# Patient Record
Sex: MALE | Race: BLACK OR AFRICAN AMERICAN | Employment: UNEMPLOYED | URBAN - NONMETROPOLITAN AREA
[De-identification: names, ages, dates, MRNs, and addresses within clinical notes are randomized per-mention and may not be internally consistent; named-entity substitution may affect disease eponyms.]

---

## 2024-10-07 ENCOUNTER — HOSPITAL ENCOUNTER (INPATIENT)
Age: 39
LOS: 5 days | Discharge: PSYCHIATRIC OR SUBSTANCE USE FACILITY | End: 2024-10-12
Attending: EMERGENCY MEDICINE | Admitting: INTERNAL MEDICINE

## 2024-10-07 ENCOUNTER — APPOINTMENT (OUTPATIENT)
Dept: GENERAL RADIOLOGY | Age: 39
End: 2024-10-07

## 2024-10-07 DIAGNOSIS — T14.91XA SUICIDE ATTEMPT (HCC): ICD-10-CM

## 2024-10-07 DIAGNOSIS — K92.2 UPPER GI BLEEDING: ICD-10-CM

## 2024-10-07 DIAGNOSIS — T54.92XA INGESTION OF BLEACH, INTENTIONAL SELF-HARM, INITIAL ENCOUNTER (HCC): Primary | ICD-10-CM

## 2024-10-07 PROBLEM — T54.94XA: Status: ACTIVE | Noted: 2024-10-07

## 2024-10-07 LAB
ALBUMIN SERPL BCG-MCNC: 4 G/DL (ref 3.5–5.1)
ALP SERPL-CCNC: 105 U/L (ref 38–126)
ALT SERPL W/O P-5'-P-CCNC: 27 U/L (ref 11–66)
AMPHETAMINES UR QL SCN: NEGATIVE
AMYLASE SERPL-CCNC: 21 U/L (ref 20–104)
AMYLASE SERPL-CCNC: 98 U/L (ref 20–104)
ANION GAP SERPL CALC-SCNC: 13 MEQ/L (ref 8–16)
APAP SERPL-MCNC: < 5 UG/ML (ref 0–20)
AST SERPL-CCNC: 48 U/L (ref 5–40)
BARBITURATES UR QL SCN: NEGATIVE
BASOPHILS ABSOLUTE: 0 THOU/MM3 (ref 0–0.1)
BASOPHILS NFR BLD AUTO: 0.2 %
BENZODIAZ UR QL SCN: NEGATIVE
BILIRUB CONJ SERPL-MCNC: 0.2 MG/DL (ref 0.1–13.8)
BILIRUB SERPL-MCNC: 0.8 MG/DL (ref 0.3–1.2)
BILIRUB UR QL STRIP.AUTO: NEGATIVE
BUN SERPL-MCNC: 10 MG/DL (ref 7–22)
BZE UR QL SCN: NEGATIVE
CALCIUM SERPL-MCNC: 8 MG/DL (ref 8.5–10.5)
CANNABINOIDS UR QL SCN: NEGATIVE
CHARACTER UR: CLEAR
CHLORIDE SERPL-SCNC: 110 MEQ/L (ref 98–111)
CO2 SERPL-SCNC: 23 MEQ/L (ref 23–33)
COLOR, UA: YELLOW
CREAT SERPL-MCNC: 0.8 MG/DL (ref 0.4–1.2)
DEPRECATED RDW RBC AUTO: 53.5 FL (ref 35–45)
EOSINOPHIL NFR BLD AUTO: 0.2 %
EOSINOPHILS ABSOLUTE: 0 THOU/MM3 (ref 0–0.4)
ERYTHROCYTE [DISTWIDTH] IN BLOOD BY AUTOMATED COUNT: 14.2 % (ref 11.5–14.5)
ETHANOL SERPL-MCNC: 0.07 % (ref 0–0.08)
FENTANYL: POSITIVE
GFR SERPL CREATININE-BSD FRML MDRD: > 90 ML/MIN/1.73M2
GLUCOSE SERPL-MCNC: 102 MG/DL (ref 70–108)
GLUCOSE UR QL STRIP.AUTO: NEGATIVE MG/DL
HCT VFR BLD AUTO: 41.2 % (ref 42–52)
HCT VFR BLD AUTO: 44.4 % (ref 42–52)
HCT VFR BLD AUTO: 46 % (ref 42–52)
HCT VFR BLD AUTO: 49.9 % (ref 42–52)
HGB BLD-MCNC: 13.7 GM/DL (ref 14–18)
HGB BLD-MCNC: 14.2 GM/DL (ref 14–18)
HGB BLD-MCNC: 14.7 GM/DL (ref 14–18)
HGB BLD-MCNC: 16 GM/DL (ref 14–18)
HGB UR QL STRIP.AUTO: NEGATIVE
IMM GRANULOCYTES # BLD AUTO: 0.1 THOU/MM3 (ref 0–0.07)
IMM GRANULOCYTES NFR BLD AUTO: 0.4 %
INR PPP: 1.06 (ref 0.85–1.13)
KETONES UR QL STRIP.AUTO: NEGATIVE
LACTATE SERPL-SCNC: 1.1 MMOL/L (ref 0.5–2)
LACTATE SERPL-SCNC: 1.5 MMOL/L (ref 0.5–2)
LACTATE SERPL-SCNC: 2.9 MMOL/L (ref 0.5–2)
LACTIC ACID, SEPSIS: 3.2 MMOL/L (ref 0.5–1.9)
LIPASE SERPL-CCNC: 51 U/L (ref 5.6–51.3)
LYMPHOCYTES ABSOLUTE: 1.9 THOU/MM3 (ref 1–4.8)
LYMPHOCYTES NFR BLD AUTO: 8.7 %
MAGNESIUM SERPL-MCNC: 2.3 MG/DL (ref 1.6–2.4)
MCH RBC QN AUTO: 33.1 PG (ref 26–33)
MCHC RBC AUTO-ENTMCNC: 32.1 GM/DL (ref 32.2–35.5)
MCV RBC AUTO: 103.1 FL (ref 80–94)
MONOCYTES ABSOLUTE: 1.4 THOU/MM3 (ref 0.4–1.3)
MONOCYTES NFR BLD AUTO: 6.5 %
NEUTROPHILS ABSOLUTE: 18.7 THOU/MM3 (ref 1.8–7.7)
NEUTROPHILS NFR BLD AUTO: 84 %
NITRITE UR QL STRIP: NEGATIVE
NRBC BLD AUTO-RTO: 0 /100 WBC
OPIATES UR QL SCN: NEGATIVE
OSMOLALITY SERPL CALC.SUM OF ELEC: 289.8 MOSMOL/KG (ref 275–300)
OXYCODONE: NEGATIVE
PCP UR QL SCN: NEGATIVE
PH UR STRIP.AUTO: 6.5 [PH] (ref 5–9)
PLATELET # BLD AUTO: 274 THOU/MM3 (ref 130–400)
PMV BLD AUTO: 9.5 FL (ref 9.4–12.4)
POTASSIUM SERPL-SCNC: 4.7 MEQ/L (ref 3.5–5.2)
PROT SERPL-MCNC: 7.2 G/DL (ref 6.1–8)
PROT UR STRIP.AUTO-MCNC: NEGATIVE MG/DL
RBC # BLD AUTO: 4.84 MILL/MM3 (ref 4.7–6.1)
REASON FOR REJECTION: NORMAL
REJECTED TEST: NORMAL
SALICYLATES SERPL-MCNC: < 0.3 MG/DL (ref 2–10)
SODIUM SERPL-SCNC: 146 MEQ/L (ref 135–145)
SP GR UR REFRACT.AUTO: 1.01 (ref 1–1.03)
UROBILINOGEN, URINE: 0.2 EU/DL (ref 0–1)
WBC # BLD AUTO: 22.3 THOU/MM3 (ref 4.8–10.8)
WBC #/AREA URNS HPF: NEGATIVE /[HPF]

## 2024-10-07 PROCEDURE — 2580000003 HC RX 258: Performed by: EMERGENCY MEDICINE

## 2024-10-07 PROCEDURE — 2580000003 HC RX 258

## 2024-10-07 PROCEDURE — 80053 COMPREHEN METABOLIC PANEL: CPT

## 2024-10-07 PROCEDURE — 6360000002 HC RX W HCPCS: Performed by: FAMILY MEDICINE

## 2024-10-07 PROCEDURE — 51701 INSERT BLADDER CATHETER: CPT

## 2024-10-07 PROCEDURE — 82248 BILIRUBIN DIRECT: CPT

## 2024-10-07 PROCEDURE — 80179 DRUG ASSAY SALICYLATE: CPT

## 2024-10-07 PROCEDURE — 94761 N-INVAS EAR/PLS OXIMETRY MLT: CPT

## 2024-10-07 PROCEDURE — 82077 ASSAY SPEC XCP UR&BREATH IA: CPT

## 2024-10-07 PROCEDURE — 2500000003 HC RX 250 WO HCPCS: Performed by: NURSE PRACTITIONER

## 2024-10-07 PROCEDURE — 0DJ08ZZ INSPECTION OF UPPER INTESTINAL TRACT, VIA NATURAL OR ARTIFICIAL OPENING ENDOSCOPIC: ICD-10-PCS | Performed by: INTERNAL MEDICINE

## 2024-10-07 PROCEDURE — 99152 MOD SED SAME PHYS/QHP 5/>YRS: CPT | Performed by: INTERNAL MEDICINE

## 2024-10-07 PROCEDURE — 2500000003 HC RX 250 WO HCPCS: Performed by: STUDENT IN AN ORGANIZED HEALTH CARE EDUCATION/TRAINING PROGRAM

## 2024-10-07 PROCEDURE — 2000000000 HC ICU R&B

## 2024-10-07 PROCEDURE — 87641 MR-STAPH DNA AMP PROBE: CPT

## 2024-10-07 PROCEDURE — 6360000002 HC RX W HCPCS

## 2024-10-07 PROCEDURE — 85014 HEMATOCRIT: CPT

## 2024-10-07 PROCEDURE — 5A1945Z RESPIRATORY VENTILATION, 24-96 CONSECUTIVE HOURS: ICD-10-PCS | Performed by: INTERNAL MEDICINE

## 2024-10-07 PROCEDURE — 6360000002 HC RX W HCPCS: Performed by: EMERGENCY MEDICINE

## 2024-10-07 PROCEDURE — 2500000003 HC RX 250 WO HCPCS

## 2024-10-07 PROCEDURE — 85018 HEMOGLOBIN: CPT

## 2024-10-07 PROCEDURE — 83690 ASSAY OF LIPASE: CPT

## 2024-10-07 PROCEDURE — 94002 VENT MGMT INPAT INIT DAY: CPT

## 2024-10-07 PROCEDURE — 83735 ASSAY OF MAGNESIUM: CPT

## 2024-10-07 PROCEDURE — 96374 THER/PROPH/DIAG INJ IV PUSH: CPT

## 2024-10-07 PROCEDURE — 80307 DRUG TEST PRSMV CHEM ANLYZR: CPT

## 2024-10-07 PROCEDURE — 83605 ASSAY OF LACTIC ACID: CPT

## 2024-10-07 PROCEDURE — 81003 URINALYSIS AUTO W/O SCOPE: CPT

## 2024-10-07 PROCEDURE — 99285 EMERGENCY DEPT VISIT HI MDM: CPT

## 2024-10-07 PROCEDURE — 2700000000 HC OXYGEN THERAPY PER DAY

## 2024-10-07 PROCEDURE — 80143 DRUG ASSAY ACETAMINOPHEN: CPT

## 2024-10-07 PROCEDURE — 82150 ASSAY OF AMYLASE: CPT

## 2024-10-07 PROCEDURE — 3609017100 HC EGD: Performed by: INTERNAL MEDICINE

## 2024-10-07 PROCEDURE — 2500000003 HC RX 250 WO HCPCS: Performed by: INTERNAL MEDICINE

## 2024-10-07 PROCEDURE — 85025 COMPLETE CBC W/AUTO DIFF WBC: CPT

## 2024-10-07 PROCEDURE — 87040 BLOOD CULTURE FOR BACTERIA: CPT

## 2024-10-07 PROCEDURE — 71045 X-RAY EXAM CHEST 1 VIEW: CPT

## 2024-10-07 PROCEDURE — 99291 CRITICAL CARE FIRST HOUR: CPT | Performed by: INTERNAL MEDICINE

## 2024-10-07 PROCEDURE — 0BH17EZ INSERTION OF ENDOTRACHEAL AIRWAY INTO TRACHEA, VIA NATURAL OR ARTIFICIAL OPENING: ICD-10-PCS | Performed by: INTERNAL MEDICINE

## 2024-10-07 PROCEDURE — 2500000003 HC RX 250 WO HCPCS: Performed by: EMERGENCY MEDICINE

## 2024-10-07 PROCEDURE — 36415 COLL VENOUS BLD VENIPUNCTURE: CPT

## 2024-10-07 PROCEDURE — 31500 INSERT EMERGENCY AIRWAY: CPT

## 2024-10-07 PROCEDURE — 51798 US URINE CAPACITY MEASURE: CPT

## 2024-10-07 PROCEDURE — 85610 PROTHROMBIN TIME: CPT

## 2024-10-07 RX ORDER — FENTANYL CITRATE 50 UG/ML
100 INJECTION, SOLUTION INTRAMUSCULAR; INTRAVENOUS ONCE
Status: COMPLETED | OUTPATIENT
Start: 2024-10-07 | End: 2024-10-07

## 2024-10-07 RX ORDER — FENTANYL CITRATE-0.9 % NACL/PF 20 MCG/2ML
50 SYRINGE (ML) INTRAVENOUS EVERY 30 MIN PRN
Status: DISCONTINUED | OUTPATIENT
Start: 2024-10-07 | End: 2024-10-09

## 2024-10-07 RX ORDER — ROCURONIUM BROMIDE 10 MG/ML
100 INJECTION, SOLUTION INTRAVENOUS ONCE
Status: COMPLETED | OUTPATIENT
Start: 2024-10-07 | End: 2024-10-07

## 2024-10-07 RX ORDER — SODIUM CHLORIDE 9 MG/ML
25 INJECTION, SOLUTION INTRAVENOUS PRN
Status: DISCONTINUED | OUTPATIENT
Start: 2024-10-07 | End: 2024-10-12 | Stop reason: HOSPADM

## 2024-10-07 RX ORDER — SODIUM CHLORIDE 0.9 % (FLUSH) 0.9 %
5-40 SYRINGE (ML) INJECTION PRN
Status: DISCONTINUED | OUTPATIENT
Start: 2024-10-07 | End: 2024-10-12 | Stop reason: HOSPADM

## 2024-10-07 RX ORDER — SODIUM CHLORIDE 0.9 % (FLUSH) 0.9 %
5-40 SYRINGE (ML) INJECTION EVERY 12 HOURS SCHEDULED
Status: DISCONTINUED | OUTPATIENT
Start: 2024-10-07 | End: 2024-10-12 | Stop reason: HOSPADM

## 2024-10-07 RX ORDER — PROPOFOL 10 MG/ML
INJECTION, EMULSION INTRAVENOUS
Status: COMPLETED
Start: 2024-10-07 | End: 2024-10-07

## 2024-10-07 RX ORDER — ETOMIDATE 2 MG/ML
20 INJECTION INTRAVENOUS ONCE
Status: COMPLETED | OUTPATIENT
Start: 2024-10-07 | End: 2024-10-07

## 2024-10-07 RX ORDER — SODIUM CHLORIDE 9 MG/ML
INJECTION, SOLUTION INTRAVENOUS CONTINUOUS
Status: DISCONTINUED | OUTPATIENT
Start: 2024-10-07 | End: 2024-10-10

## 2024-10-07 RX ORDER — NOREPINEPHRINE BITARTRATE 0.06 MG/ML
1-100 INJECTION, SOLUTION INTRAVENOUS CONTINUOUS
Status: DISCONTINUED | OUTPATIENT
Start: 2024-10-07 | End: 2024-10-08

## 2024-10-07 RX ORDER — FENTANYL CITRATE-0.9 % NACL/PF 10 MCG/ML
PLASTIC BAG, INJECTION (ML) INTRAVENOUS
Status: COMPLETED
Start: 2024-10-07 | End: 2024-10-07

## 2024-10-07 RX ORDER — FENTANYL CITRATE-0.9 % NACL/PF 10 MCG/ML
25-200 PLASTIC BAG, INJECTION (ML) INTRAVENOUS CONTINUOUS
Status: DISCONTINUED | OUTPATIENT
Start: 2024-10-07 | End: 2024-10-07

## 2024-10-07 RX ORDER — PROPOFOL 10 MG/ML
20 INJECTION, EMULSION INTRAVENOUS CONTINUOUS
Status: DISCONTINUED | OUTPATIENT
Start: 2024-10-07 | End: 2024-10-07

## 2024-10-07 RX ORDER — PROPOFOL 10 MG/ML
5-50 INJECTION, EMULSION INTRAVENOUS CONTINUOUS
Status: DISCONTINUED | OUTPATIENT
Start: 2024-10-07 | End: 2024-10-09

## 2024-10-07 RX ORDER — DEXMEDETOMIDINE HYDROCHLORIDE 4 UG/ML
.1-1.5 INJECTION, SOLUTION INTRAVENOUS CONTINUOUS
Status: DISCONTINUED | OUTPATIENT
Start: 2024-10-07 | End: 2024-10-08

## 2024-10-07 RX ORDER — FENTANYL CITRATE 50 UG/ML
INJECTION, SOLUTION INTRAMUSCULAR; INTRAVENOUS
Status: COMPLETED
Start: 2024-10-07 | End: 2024-10-07

## 2024-10-07 RX ORDER — FENTANYL CITRATE-0.9 % NACL/PF 20 MCG/2ML
50 SYRINGE (ML) INTRAVENOUS EVERY 30 MIN PRN
Status: DISCONTINUED | OUTPATIENT
Start: 2024-10-07 | End: 2024-10-07

## 2024-10-07 RX ORDER — 0.9 % SODIUM CHLORIDE 0.9 %
1000 INTRAVENOUS SOLUTION INTRAVENOUS ONCE
Status: COMPLETED | OUTPATIENT
Start: 2024-10-07 | End: 2024-10-07

## 2024-10-07 RX ORDER — FENTANYL CITRATE-0.9 % NACL/PF 10 MCG/ML
25-200 PLASTIC BAG, INJECTION (ML) INTRAVENOUS CONTINUOUS
Status: DISCONTINUED | OUTPATIENT
Start: 2024-10-07 | End: 2024-10-09

## 2024-10-07 RX ADMIN — ETOMIDATE 20 MG: 2 INJECTION, SOLUTION INTRAVENOUS at 05:28

## 2024-10-07 RX ADMIN — PROPOFOL 20 MCG/KG/MIN: 10 INJECTION, EMULSION INTRAVENOUS at 05:37

## 2024-10-07 RX ADMIN — PANTOPRAZOLE SODIUM 80 MG: 40 INJECTION, POWDER, FOR SOLUTION INTRAVENOUS at 06:04

## 2024-10-07 RX ADMIN — PROPOFOL 50 MG: 10 INJECTION, EMULSION INTRAVENOUS at 07:30

## 2024-10-07 RX ADMIN — PIPERACILLIN AND TAZOBACTAM 4500 MG: 4; .5 INJECTION, POWDER, FOR SOLUTION INTRAVENOUS at 11:47

## 2024-10-07 RX ADMIN — Medication 5 MCG/MIN: at 20:40

## 2024-10-07 RX ADMIN — PANTOPRAZOLE SODIUM 8 MG/HR: 40 INJECTION, POWDER, FOR SOLUTION INTRAVENOUS at 08:52

## 2024-10-07 RX ADMIN — Medication 125 MCG/HR: at 13:35

## 2024-10-07 RX ADMIN — PROPOFOL 50 MCG/KG/MIN: 10 INJECTION, EMULSION INTRAVENOUS at 10:30

## 2024-10-07 RX ADMIN — Medication 50 MCG/HR: at 06:54

## 2024-10-07 RX ADMIN — FENTANYL CITRATE 100 MCG: 50 INJECTION, SOLUTION INTRAMUSCULAR; INTRAVENOUS at 07:30

## 2024-10-07 RX ADMIN — Medication 25 MCG: at 07:08

## 2024-10-07 RX ADMIN — PANTOPRAZOLE SODIUM 8 MG/HR: 40 INJECTION, POWDER, FOR SOLUTION INTRAVENOUS at 18:09

## 2024-10-07 RX ADMIN — ROCURONIUM BROMIDE 100 MG: 10 INJECTION, SOLUTION INTRAVENOUS at 05:30

## 2024-10-07 RX ADMIN — Medication 100 MCG/HR: at 21:59

## 2024-10-07 RX ADMIN — SODIUM CHLORIDE 1000 ML: 9 INJECTION, SOLUTION INTRAVENOUS at 05:41

## 2024-10-07 RX ADMIN — Medication 50 MCG: at 06:54

## 2024-10-07 RX ADMIN — PIPERACILLIN AND TAZOBACTAM 3375 MG: 3; .375 INJECTION, POWDER, FOR SOLUTION INTRAVENOUS at 17:45

## 2024-10-07 RX ADMIN — PROPOFOL 50 MCG/KG/MIN: 10 INJECTION, EMULSION INTRAVENOUS at 19:32

## 2024-10-07 RX ADMIN — PROPOFOL 50 MCG/KG/MIN: 10 INJECTION, EMULSION INTRAVENOUS at 15:08

## 2024-10-07 RX ADMIN — SODIUM CHLORIDE, PRESERVATIVE FREE 10 ML: 5 INJECTION INTRAVENOUS at 19:58

## 2024-10-07 RX ADMIN — PROPOFOL 30 MCG/KG/MIN: 10 INJECTION, EMULSION INTRAVENOUS at 06:44

## 2024-10-07 RX ADMIN — Medication 0.2 MCG/KG/HR: at 17:48

## 2024-10-07 ASSESSMENT — PULMONARY FUNCTION TESTS
PIF_VALUE: 19
PIF_VALUE: 19
PIF_VALUE: 20
PIF_VALUE: 21
PIF_VALUE: 23

## 2024-10-07 NOTE — ED PROVIDER NOTES
[facility] ICU 4D      EMERGENCY MEDICINE     Pt Name: James Palacio  MRN: [id number]  Birthdate 1985  Date of evaluation: 10/7/2024  Provider: AFTAB FAITH MD    CHIEF COMPLAINT       Chief Complaint   Patient presents with    Ingestion     HISTORY OF PRESENT ILLNESS   James Palacio is a pleasant 39 y.o. male who presents to the emergency department from from home, brought in by EMS for evaluation of intentional ingestion of Clorox bleach.  Wife states that she had gotten up to get ready for work when she heard a noise.  She came down and found him laying on the ground covered in bleach and he had frothy vomit around his mouth.  EMS took a picture and it was Clorox to stain remover powdered bleach.  They stated there was a large amount in a cup that had been mixed with water.  Patient was covered in powdery substance.  He is Liberian Creole speaking but was altered on arrival to the emergency department.  History was limited secondary to this.  Wife was present and stated that last night they had a fight.  She stated things in their marriage have not been good and she told him she was going to divorce him.  At that point he threatened to kill himself.  He has never had a previous psychiatric history.  He has never had any previous attempts at suicide.  Wife states that she is not aware of any medical issues and he does not take medication regularly.    PASTMEDICAL HISTORY   No past medical history on file.    Patient Active Problem List   Diagnosis Code    Ingestion of bleach, undetermined intent, initial encounter T54.94XA     SURGICAL HISTORY     No past surgical history on file.    CURRENT MEDICATIONS       There are no discharge medications for this patient.      ALLERGIES     has no allergies on file.    FAMILY HISTORY     has no family status information on file.        SOCIAL HISTORY       Social History     Tobacco Use    Smoking status: Some Days     Current packs/day: 0.25     Types: Cigarettes  understanding. Any medications were reviewed and indications and risks of medications were discussed with the patient /family present. Strict verbal and written return precautions, instructions and appropriate follow-up provided to  the patient .     ED Medications administered this visit:  (None if blank)  Medications   pantoprazole (PROTONIX) 80 mg in sodium chloride 0.9 % 100 mL infusion (8 mg/hr IntraVENous Rate/Dose Verify 10/8/24 0513)   propofol infusion (25 mcg/kg/min × 65.8 kg IntraVENous Rate/Dose Verify 10/8/24 0513)   piperacillin-tazobactam (ZOSYN) 4,500 mg in sodium chloride 0.9 % 100 mL IVPB (mini-bag) (0 mg IntraVENous Stopped 10/7/24 1217)     Followed by   piperacillin-tazobactam (ZOSYN) 3,375 mg in sodium chloride 0.9 % 50 mL IVPB (mini-bag) ( IntraVENous Stopped 10/8/24 0450)   sodium chloride flush 0.9 % injection 5-40 mL (10 mLs IntraVENous Given 10/7/24 1958)   sodium chloride flush 0.9 % injection 5-40 mL (has no administration in time range)   0.9 % sodium chloride infusion (has no administration in time range)   0.9 % sodium chloride infusion ( IntraVENous Rate/Dose Verify 10/8/24 0513)   norepinephrine (LEVOPHED) 16 mg in sodium chloride 0.9 % 250 mL infusion (2 mcg/min IntraVENous Rate/Dose Verify 10/8/24 0513)   fentaNYL (SUBLIMAZE) 1,000 mcg in sodium chloride 0.9% 100 mL infusion (25 mcg/hr IntraVENous Rate/Dose Verify 10/8/24 0513)     And   fentaNYL (SUBLIMAZE) bolus from bag (has no administration in time range)   dexmedeTOMIDine (PRECEDEX) 400 mcg in sodium chloride 0.9 % 100 mL infusion (1 mcg/kg/hr × 61.3 kg IntraVENous Rate/Dose Verify 10/8/24 0513)   etomidate (AMIDATE) injection 20 mg (20 mg IntraVENous Given 10/7/24 0528)   rocuronium (ZEMURON) injection 100 mg (100 mg IntraVENous Given 10/7/24 0530)   sodium chloride 0.9 % bolus 1,000 mL (0 mLs IntraVENous Stopped 10/7/24 0558)   pantoprazole (PROTONIX) 80 mg in sodium chloride 0.9 % 50 mL bolus (0 mg IntraVENous Stopped

## 2024-10-07 NOTE — PROGRESS NOTES
Vent circuit and in line suction needs changed in 7 days on 10-14-24.   Endotracheal Tube camejo, if applicable, needs changed in 3 days on 10-10-24.

## 2024-10-07 NOTE — BRIEF OP NOTE
Brief Postoperative Note      Patient: James Palacio  YOB: 1985  MRN: [id number]    Date of Procedure: 10/7/2024    Pre-Op Diagnosis Codes:      * Epigastric pain [R10.13]    Post-Op Diagnosis:  erosive gastritis, normal esophagus and duodenum       Procedure(s):  EGD    Surgeon(s):  Jeremy Fisher MD    Assistant:  * No surgical staff found *    Anesthesia: IV Sedation    Estimated Blood Loss (mL): Minimal    Complications: None    Specimens:   * No specimens in log *    Implants:  * No implants in log *      Drains:   NG/OG/NJ/NE Tube Orogastric 18 fr Center mouth (Active)   Surrounding Skin Clean, dry & intact 10/07/24 1200   Securement device Tape 10/07/24 1200   Status Suction-low intermittent 10/07/24 1200   Placement Verified X-Ray (Initial);External Catheter Length 10/07/24 1200   NG/OG/NJ/NE External Measurement (cm) 55 cm 10/07/24 1200   Drainage Appearance Brown;Dark Red 10/07/24 1200   Output (mL) 100 ml 10/07/24 1651       External Urinary Catheter (Active)   Output (mL) 0 mL 10/07/24 1651       Findings:  Infection Present At Time Of Surgery (PATOS) (choose all levels that have infection present):  No infection present  Other Findings: erosive gastritis, normal esophagus and duodenum    Electronically signed by Jeremy Fisher MD on 10/7/2024 at 5:10 PM

## 2024-10-07 NOTE — PROGRESS NOTES
A size 8 endotracheal tube was successfully placed using a size 4 blade. The Lot number for the endotracheal tube was [id number].

## 2024-10-07 NOTE — ED NOTES
Patient cleaned up at this time. Powder cleaned off patient at this time. Patient placed in gown.

## 2024-10-07 NOTE — PROGRESS NOTES
Comprehensive Nutrition Assessment    Type and Reason for Visit:  Initial (ventilator patient)    Nutrition Recommendations/Plan:   When able to use gut, recommend Vital 1.2 at 10ml/ hour, increase by 10ml every 6 hours as tolerates to goal 40ml/ hour (= 1672 kcals including diprivan kcals, 72 grams protein/ 24 hours); additional free water flush as per Physician      Malnutrition Assessment:  Malnutrition Status:  Insufficient data (10/07/24 1455)    Context:  Acute Illness     Findings of the 6 clinical characteristics of malnutrition:  Energy Intake:  50% or less of estimated energy requirements for 5 or more days (intake ~50% of patient's typical for past 2 weeks PTA per significant other; currently intubated)  Weight Loss:  Unable to assess (no weight records per EMR and S.O unsure of UBW)     Body Fat Loss:  Unable to assess     Muscle Mass Loss:  Unable to assess    Fluid Accumulation:  No significant fluid accumulation     Strength:  Not Performed    Nutrition Assessment:     Pt. nutritionally compromised AEB NPO status due to intubation 10/7 and altered GI status. At risk for further nutrition compromise r/t admit following ingestion of toxic substance (bleach powder), leukocytosis, poor appetite/ intake for 2 weeks PTA per S.O.       Nutrition Related Findings:    Pt. Report/Treatments/Miscellaneous: Patient seen intubated, OG tube to intermittent suction with 400ml output earlier today with bloody appearance drainage per RN and GI consulted for possible EGD, Significant other at bedside reports patient had not been hungry for past 2 weeks and intake was ~50% of his typical, typically eats 1-2 meals daily   GI Status: hypoactive bowel sounds per RN  Pertinent Labs: Sodium 146, Potassium 4.7, BUN 10, Creatinine 0.8, glucose 102,   Pertinent Meds: zosyn, fentanyl, protonix drip, diprivan      Wound Type: None       Current Nutrition Intake & Therapies:          Diet NPO Exceptions are: Sips of Water with

## 2024-10-07 NOTE — ED NOTES
Assumed care of pt. Pt noted to be alert and attempting to communicate with staff. Pt attempting to sit up on cot. Verbal order from Dr. Nuñez for propfol bolus of 50 and fentanyl bolus 25. Pt tolerating well.

## 2024-10-07 NOTE — ED NOTES
ED to inpatient nurses report      Chief Complaint:  Chief Complaint   Patient presents with    Ingestion     Present to ED from: home    MOA:     LOC: unresponsive  Mobility: Fully dependent  Oxygen Baseline: room air    Current needs required: ventilator     Code Status:   No Order    What abnormal results were found and what did you give/do to treat them? Intubation, propofol, protonix  Any procedures or intervention occur? NA    Mental Status:  Level of Consciousness: Unresponsive (3)    Psych Assessment:        Vitals:  Patient Vitals for the past 24 hrs:   BP Temp Temp src Pulse Resp SpO2 Weight   10/07/24 0605 (!) 138/102 -- -- 78 12 100 % --   10/07/24 0555 (!) 142/101 -- -- 74 15 100 % --   10/07/24 0540 (!) 151/110 97.3 °F (36.3 °C) Axillary 90 12 100 % --   10/07/24 0535 (!) 139/111 -- -- 98 12 100 % --   10/07/24 0531 (!) 115/92 -- -- 89 16 100 % --   10/07/24 0526 93/71 -- -- 85 20 100 % --   10/07/24 0521 (!) 127/92 -- -- 89 24 96 % 65.8 kg (145 lb)        LDAs:   Peripheral IV 10/07/24 Right Forearm (Active)   Site Assessment Clean, dry & intact 10/07/24 0606   Line Status Infusing 10/07/24 0606   Phlebitis Assessment No symptoms 10/07/24 0606   Infiltration Assessment 0 10/07/24 0606   Dressing Status Clean, dry & intact 10/07/24 0606       Peripheral IV 10/07/24 Right Cephalic (Active)   Site Assessment Clean, dry & intact 10/07/24 0606   Line Status Infusing 10/07/24 0606   Phlebitis Assessment No symptoms 10/07/24 0606   Infiltration Assessment 0 10/07/24 0606   Dressing Status Clean, dry & intact 10/07/24 0606       Ambulatory Status:  No data recorded    Diagnosis:  DISPOSITION Admitted 10/07/2024 06:09:35 AM   Final diagnoses:   Ingestion of bleach, intentional self-harm, initial encounter ([facility])   Upper GI bleeding   Suicide attempt ([facility])        Consults:  None     Pain Score:       C-SSRS:        Sepsis Screening:       Liseth Fall Risk:       Swallow Screening        Preferred Language:

## 2024-10-07 NOTE — PROGRESS NOTES
EGD complete, no biopsies taken. photos taken, pt tolerated procedure well. Scope Number  used. Primary nurse at bedside for the procedure.

## 2024-10-07 NOTE — H&P
CRITICAL CARE H&P NOTE      Patient:  James Elminice    Unit/Bed:4D-12/012-A  YOB: 1985  MRN: [id number]   PCP: No primary care provider on file.  Date of Admission: 10/7/2024    Chief Complaint:- Toxic Ingestion      Assessment and Plan:    Ingestion of toxic substance, self harm: Patient presented to the ED after ingestion of breath.  EMS found standard removal powder next to him as well as powder all over him.  Patient came in with frothy sputum with blood foaming from mouth.  Patient was agitated and combative.  Patient was intubated in the ED primary prevention and Poison control reach out to who suggested supportive measures.  Patient given OG, output so far has been 400 mL of german brown liquid.  H&H to be repeated.  Patient also has an increased AST and salicylic acid level suggesting concomitant ingestion.  Patient does not have a history of self-harm or suicide attempts previously.  Patient to be kept sedated, airway protected.  EGD to be scheduled eventually in the next 48 hours to rule out lasting esophageal trauma or complications.  Patient to be monitored for evidence of perforation in chest or abdomen.  Patient on Protonix for GI support and prevention of emesis which can result in possible perforation.  Patient also on propofol and fentanyl, Precedex or Versed likely to be added for further sedation as required.  Leukocytosis: Patient has white cell count of 22.3 with neutrophils 18.7 and monocytes 1.4, immature granulocytes 0.10.  This is likely due to previous pre-existing infection or possible microperforation.  Chest x-ray does not show any evidence, patient to be monitored for any signs.  Cultures to be sent and patient started on Zosyn.  Macrocytosis: Patient has an MCV of 103.1 with an MCH 33.1 and MCHC 32.1.  Red cell distribution width standard deviation is 53.5.  Patient also has an increased AST to ALT ratio with AST 48.  Patient to be hydrated, findings could be likely  neck  Lungs: clear to auscultation.  No retractions.  No crepitus on chest palpation, no evidence of tenderness.  Cardiac: RRR.  No JVD.  Abdomen: soft.  No evidence of peritonitis or distention.  There is some tenderness without guarding.  Extremities:  No clubbing, cyanosis, or edema x 4.    Vasculature: capillary refill < 3 seconds. Palpable dorsalis pedis pulses.  Skin:  warm and dry.  Psych: Patient is sedated and intubated.  He does open his eyes in response to voice and localizes pain.  Patient is agitated and fighting sedation.   Lymph:  No supraclavicular adenopathy.  Neurologic:  No focal deficit. No seizures.      Data: (All radiographs, tracings, PFTs, and imaging are personally viewed and interpreted unless otherwise noted).   Sodium 146, Potassium 4.7, Chloride 110, BUN 10, Cr 0.8, Magnesium 2.3, Calcium 8.0, Lactic acid 3.2  WBC 22.3, Neutros 18.7, HgB 16.0, Hct 49.9, Plt 274  Telemetry shows sinus rhythm        Seen with multidisciplinary ICU team .  Meets Continued ICU Level Care Criteria:    [x] Yes   [] No - Transfer Planned to listed location:  [] HOSPITALIST CONTACTED-      Case and plan discussed with Dr. Mayorga.        Electronically signed by Osiel Perez MD  CRITICAL CARE SPECIALIST   Patient seen by me including key components of medical care.  Case discussed with resident physician.  Consult GI for upper endoscopy assessment of esophagus and stomach.  Continue with mechanical ventilator support.  Continue with IV hydration.  Initiate Zosyn.  Italicized bold font, if present,  represents changes to the note made by me.  CC time 35 minutes.  Time was discontiguous. Time does not include procedure. Time does include my direct assessment of the patient and coordination of care.  Time represents more than 50% of the time involved with patient care by the CC team.  Electronically signed by Ras Mayorga MD.

## 2024-10-07 NOTE — ED NOTES
Patient resting on cot. VS stable. Telemetry in place. IV medications infusing at this time. Family at bedside.

## 2024-10-07 NOTE — PROGRESS NOTES
Patient arrived to unit from ED via cart. Patient transferred to ICU bed and placed on continuous ICU bedside monitor. Patient admitted for Suicide attempt (HCC) [T14.91XA]  Upper GI bleeding [K92.2]  Ingestion of bleach, intentional self-harm, initial encounter (HCC) [T54.92XA]  Ingestion of bleach, undetermined intent, initial encounter [T54.94XA]. Vitals obtained. See flowsheets. Patient's IV access includes rightAC, right cephalic. Current infusions and rates of infusion include fentanyl @75 mcg/hr,propofol@45. Assessment completed by Nia. Two nurse skin assessment completed by Trinidad RN and Sanjiv RN. See flowsheets for assessment details. Policies and procedures of ICU unable to be explained to patient at this time. Family member(s)/representative(s) present at time of admission include girlfriend Lisbet. Patient rights explained to family member(s)/representatives and patient, as able. Patient/patient's family member(s)/representative(s) N/A to have physician notified of their admission. All questions posed by patient's family member(s)/representative(s) and patient answered at this time.

## 2024-10-07 NOTE — CARE COORDINATION
Case Management Assessment Initial Evaluation    Date/Time of Evaluation: 10/7/2024 9:56 AM  Assessment Completed by: Umu Travis RN    If patient is discharged prior to next notation, then this note serves as note for discharge by case management.    Patient Name: James Palacio                   YOB: 1985  Diagnosis: Suicide attempt (HCC) [T14.91XA]  Upper GI bleeding [K92.2]  Ingestion of bleach, intentional self-harm, initial encounter (HCC) [T54.92XA]  Ingestion of bleach, undetermined intent, initial encounter [T54.94XA]                   Date / Time: 10/7/2024  5:14 AM  Location: [address] [place] [address]     Patient Admission Status: Inpatient   Readmission Risk Low 0-14, Mod 15-19), High > 20: Readmission Risk Score: 7.8    Current PCP: No primary care provider on file.  Gave list    Health Care Decision Makers: S.O. with him calls herself wife and did not give CM any family members names    Additional Case Management Notes: Presented to ED after S.O. found him lying on the ground covered in bleach powder, and bleach mixed with water in a cup nearby. They had reportedly gotten in an argument prior, threatened divorce, and he threatened to kill himself. Agitated with frothy, bloody secretions. Intubated in ED for airway protection. Admitted to ICU. GI consulted for post-ingestion of bleach.     Remains on vent w/ETT on SBT, FIO2 30%, sats 100%. Afebrile. NSR. Follows commands x4. Telemetry, OG to LIWS, external urinary catheter, SCDs. IVF, fentanyl @ 125 mcg/hr, protonix @ 8 mg/hr, diprivan @ 50 mcg/kg/min, IV zosyn Q8H. Received 1L fld bolus x1. Blood cultures sent. Na+ 146, LA 3.2 - then 2.9, ast 48, wbc 22.3.     Procedures:   10/7 Intubated    Imaging:   10/7 CXR: No acute process    Patient Goals/Plan/Treatment Preferences: From home w/S.O. Will need Psych eval after extubated. Likely 7K, IP Psych, at discharge.      10/07/24 1433   Service Assessment   Patient Orientation Sedated  (on vent)    Cognition Other (see comment)  (Sedated on vent)   History Provided By Significant Other  (Calls herself wife)   Primary Caregiver Self   Accompanied By/Relationship Girlfriend Lisbet   Support Systems Spouse/Significant Other   Patient's Healthcare Decision Maker is: Legal Next of Kin   PCP Verified by CM Yes   Prior Functional Level Independent in ADLs/IADLs   Current Functional Level Other (see comment)  (DIANNE; sedated on vent)   Can patient return to prior living arrangement Yes   Ability to make needs known: Other (see comment)  (DIANNE; sedated on vent)   Family able to assist with home care needs: Yes   Would you like for me to discuss the discharge plan with any other family members/significant others, and if so, who? No   Financial Resources None   Community Resources None   Social/Functional History   Active  No   Patient's  Info Girlfriend   Discharge Planning   Type of Residence House   Living Arrangements Spouse/Significant Other   Current Services Prior To Admission None   Potential Assistance Needed Other (Comment)  (IP Psych)   DME Ordered? No   Potential Assistance Purchasing Medications Yes   Type of Home Care Services None   Patient expects to be discharged to: Other (comment)  (GF understands likely discharge to IP Psych when medically stable off vent)   Services At/After Discharge   Confirm Follow Up Transport Family   Condition of Participation: Discharge Planning   The Plan for Transition of Care is related to the following treatment goals: Get better

## 2024-10-07 NOTE — PLAN OF CARE
Problem: Respiratory - Adult  Goal: Achieves optimal ventilation and oxygenation  Outcome: Progressing                                                 Patient Weaning Progress    The patient's vent settings was able to be weaned this shift.      Ventilator settings that were weaned              [x] Mode   [x] Pressure support weaned   [x] Fio2 weaned   [] Peep weaned      Spontaneous weaning trial  was attempted.       The patient was able to tolerate SBT.   RSBI  was 20 with EtCO2 of 59 and SpO2 of 100 on 30% FiO2.  Spontanteous VT was 437 and RR 12 breaths/min.  The patient was on SBT for 15 minutes     Evac tube was  hooked up with continuous low suction(20-30mmHg)      Cuff was  deflated to determine cuff leak. A leak  was detected.     Family mutually agreed on goals.

## 2024-10-08 LAB
ANION GAP SERPL CALC-SCNC: 9 MEQ/L (ref 8–16)
BASOPHILS ABSOLUTE: 0 THOU/MM3 (ref 0–0.1)
BASOPHILS NFR BLD AUTO: 0.2 %
BUN SERPL-MCNC: 14 MG/DL (ref 7–22)
CALCIUM SERPL-MCNC: 7.6 MG/DL (ref 8.5–10.5)
CHLORIDE SERPL-SCNC: 108 MEQ/L (ref 98–111)
CO2 SERPL-SCNC: 25 MEQ/L (ref 23–33)
CREAT SERPL-MCNC: 0.9 MG/DL (ref 0.4–1.2)
DEPRECATED RDW RBC AUTO: 53.7 FL (ref 35–45)
EOSINOPHIL NFR BLD AUTO: 0.2 %
EOSINOPHILS ABSOLUTE: 0 THOU/MM3 (ref 0–0.4)
ERYTHROCYTE [DISTWIDTH] IN BLOOD BY AUTOMATED COUNT: 14.5 % (ref 11.5–14.5)
GFR SERPL CREATININE-BSD FRML MDRD: > 90 ML/MIN/1.73M2
GLUCOSE SERPL-MCNC: 85 MG/DL (ref 70–108)
HCT VFR BLD AUTO: 43 % (ref 42–52)
HGB BLD-MCNC: 13.8 GM/DL (ref 14–18)
IMM GRANULOCYTES # BLD AUTO: 0.15 THOU/MM3 (ref 0–0.07)
IMM GRANULOCYTES NFR BLD AUTO: 0.7 %
LACTATE SERPL-SCNC: 1.4 MMOL/L (ref 0.5–2)
LYMPHOCYTES ABSOLUTE: 2.3 THOU/MM3 (ref 1–4.8)
LYMPHOCYTES NFR BLD AUTO: 10.7 %
MCH RBC QN AUTO: 32.8 PG (ref 26–33)
MCHC RBC AUTO-ENTMCNC: 32.1 GM/DL (ref 32.2–35.5)
MCV RBC AUTO: 102.1 FL (ref 80–94)
MONOCYTES ABSOLUTE: 1.8 THOU/MM3 (ref 0.4–1.3)
MONOCYTES NFR BLD AUTO: 8.4 %
MRSA DNA SPEC QL NAA+PROBE: NEGATIVE
NEUTROPHILS ABSOLUTE: 17.4 THOU/MM3 (ref 1.8–7.7)
NEUTROPHILS NFR BLD AUTO: 79.8 %
NRBC BLD AUTO-RTO: 0 /100 WBC
PLATELET # BLD AUTO: 174 THOU/MM3 (ref 130–400)
PMV BLD AUTO: 10.4 FL (ref 9.4–12.4)
POTASSIUM SERPL-SCNC: 3.8 MEQ/L (ref 3.5–5.2)
RBC # BLD AUTO: 4.21 MILL/MM3 (ref 4.7–6.1)
SCAN OF BLOOD SMEAR: NORMAL
SODIUM SERPL-SCNC: 142 MEQ/L (ref 135–145)
WBC # BLD AUTO: 21.8 THOU/MM3 (ref 4.8–10.8)

## 2024-10-08 PROCEDURE — 80048 BASIC METABOLIC PNL TOTAL CA: CPT

## 2024-10-08 PROCEDURE — 99291 CRITICAL CARE FIRST HOUR: CPT | Performed by: INTERNAL MEDICINE

## 2024-10-08 PROCEDURE — 94003 VENT MGMT INPAT SUBQ DAY: CPT

## 2024-10-08 PROCEDURE — 2500000003 HC RX 250 WO HCPCS: Performed by: STUDENT IN AN ORGANIZED HEALTH CARE EDUCATION/TRAINING PROGRAM

## 2024-10-08 PROCEDURE — 83605 ASSAY OF LACTIC ACID: CPT

## 2024-10-08 PROCEDURE — 2580000003 HC RX 258: Performed by: EMERGENCY MEDICINE

## 2024-10-08 PROCEDURE — 94761 N-INVAS EAR/PLS OXIMETRY MLT: CPT

## 2024-10-08 PROCEDURE — 2060000000 HC ICU INTERMEDIATE R&B

## 2024-10-08 PROCEDURE — 51798 US URINE CAPACITY MEASURE: CPT

## 2024-10-08 PROCEDURE — 2700000000 HC OXYGEN THERAPY PER DAY

## 2024-10-08 PROCEDURE — 6360000002 HC RX W HCPCS: Performed by: EMERGENCY MEDICINE

## 2024-10-08 PROCEDURE — 85025 COMPLETE CBC W/AUTO DIFF WBC: CPT

## 2024-10-08 PROCEDURE — 36415 COLL VENOUS BLD VENIPUNCTURE: CPT

## 2024-10-08 PROCEDURE — 6360000002 HC RX W HCPCS

## 2024-10-08 PROCEDURE — 51701 INSERT BLADDER CATHETER: CPT

## 2024-10-08 PROCEDURE — 2580000003 HC RX 258

## 2024-10-08 RX ADMIN — PIPERACILLIN AND TAZOBACTAM 3375 MG: 3; .375 INJECTION, POWDER, FOR SOLUTION INTRAVENOUS at 08:52

## 2024-10-08 RX ADMIN — PANTOPRAZOLE SODIUM 8 MG/HR: 40 INJECTION, POWDER, FOR SOLUTION INTRAVENOUS at 04:20

## 2024-10-08 RX ADMIN — SODIUM CHLORIDE, PRESERVATIVE FREE 10 ML: 5 INJECTION INTRAVENOUS at 08:55

## 2024-10-08 RX ADMIN — PIPERACILLIN AND TAZOBACTAM 3375 MG: 3; .375 INJECTION, POWDER, FOR SOLUTION INTRAVENOUS at 00:41

## 2024-10-08 RX ADMIN — PIPERACILLIN AND TAZOBACTAM 3375 MG: 3; .375 INJECTION, POWDER, FOR SOLUTION INTRAVENOUS at 17:17

## 2024-10-08 RX ADMIN — PANTOPRAZOLE SODIUM 8 MG/HR: 40 INJECTION, POWDER, FOR SOLUTION INTRAVENOUS at 14:51

## 2024-10-08 RX ADMIN — Medication 1 MCG/KG/HR: at 05:12

## 2024-10-08 RX ADMIN — PROPOFOL 50 MCG/KG/MIN: 10 INJECTION, EMULSION INTRAVENOUS at 01:18

## 2024-10-08 ASSESSMENT — PULMONARY FUNCTION TESTS
PIF_VALUE: 23
PIF_VALUE: 22

## 2024-10-08 NOTE — PROGRESS NOTES
Patient has been successfully weaned from Mechanical Ventilation.  RSBI before extubation was 24 with EtCO2 of 51 and SpO2 of 100 on 21% FiO2.  Patient extubated and placed on room air.  Post extubation SpO2 is 100% with HR  115 bpm and RR 19 breaths/min.  Patient had strong cough that was productive of white sputum.  Extubation Well tolerated by patient..

## 2024-10-08 NOTE — PROGRESS NOTES
Reinforced suicide precautions with patient.  Reinforced that visitors will be screened and may be limited at the discretion of the nurse.  Visitor belongings are subject to be searched.  Belongings may not be allowed into the patient room.    Reviewed any personal belongings from the previous shift believed to be a threat to patient safety removed from room.  Belongings removed include:  none   Placed in secure area na .    Room screened for safety, items removed to create a safe environment include:   Blood pressure cuff   Loose or extra cords, tubing (not of medical necessity)   Extra Linens   Telephone   Toiletries   Trash Liners   Patient's cell phone and charging cord (must be removed from room)      Safety tray ordered: yes    Expectations were discussed with sitter (unit support aide).  Sitter positioned near exit.  Sitter reminded that patient should be observed at all times including toileting and bathing.  Sitter has  documentation sheet.    Patient and sitter included in hourly rounds.

## 2024-10-08 NOTE — CARE COORDINATION
10/8/24, 3:34 PM EDT    DISCHARGE ON GOING EVALUATION    [facility] day: 1  Location: -12/012-A Reason for admit: Suicide attempt (HCC) [T14.91XA]  Upper GI bleeding [K92.2]  Ingestion of bleach, intentional self-harm, initial encounter ([facility]) [T54.92XA]  Ingestion of bleach, undetermined intent, initial encounter [T54.94XA]     Procedures:   10/7 Intubated   10/7 EGD: Erosive gastritis; no evidence of significant ulcerations noted  10/8 Extubated    Imaging since last note: none    Barriers to Discharge: EGD yesterday revealed erosive gastritis, no significant ulcerations noted. Extubated today. Psych consulted.     On room air. Tmax 99.1. NSR. Ox4. Follows commands x4. Intensivist and GI following. Telemetry, external urinary catheter, SCDs. IVF, protonix @ 8 mg/hr, IV zosyn Q8H. WBC down to 21.8, hgb 13.8.     PCP: No primary care provider on file.  PCP list given    Readmission Risk Score: 10.2    Patient Goals/Plan/Treatment Preferences: From home w/wife. Does not want to go back with wife after discharged. SW consulted. Psych to see; likely 7E IP Psych at discharge.

## 2024-10-08 NOTE — PLAN OF CARE
Problem: Respiratory - Adult  Goal: Achieves optimal ventilation and oxygenation  10/8/2024 0346 by Vinicius Brothers RCP  Outcome: Progressing                                                Patient Weaning Progress    The patient's vent settings was not able to be weaned this shift.      Ventilator settings that were weaned              [] Mode   [x] Pressure support weaned   [] Fio2 weaned   [] Peep weaned      Spontaneous weaning trial  was not attempted.    Lack of inspiratory effort.     due to defined parameters for SBT (spontaneous breathing trial) not being met.     *Results of SBT documented under SBTOUTCOME note.    Reason that defined ventilator parameters for SBT was not met              [] Patient condition requires increased ventilator settings  [] Requires increased sedation   [] Settings not within weaning range   [] SAT not completed   [] Physician orders    Unable to get agreement for goals because no family is present and patient cannot respond.

## 2024-10-08 NOTE — CARE COORDINATION
DISCHARGE PLANNING EVALUATION  10/8/24, 2:30 PM EDT    Reason for Referral: Homeless  Decision Maker: makes own decisions.  Current Services:  none reported.  New Services Requested: Housing once pt is discharged.   Family/ Social/ Home environment: Assessment completed with pt using Language Line. Pt states he is  and was living with his wife prior to admission. Pt states his wife is very controlling, treats him terribly and always threatening divorce. Pt states she does speak English and has her Greed Card which allows her to work Pt states he does not have his Green Card and has applied for his work Visa. Pt states because he is not returning home at discharge and will not allow her to visit him in the hospital she will not give him the documents he needs. SW explained that pt may be going to 7E when medically ready and if not where would pt want to go. Pt states he has no where to go and is willing to go to [facility]. SW offered to call wife to try and get his documents for him. Pt agreed as long as he did not have to see her.   Payment Source: None reported.  Transportation at Discharge:  [employer]  Post-acute (PAC) provider list was provided to patient. Patient was informed of their freedom to choose PAC provider. Discussed and offered to show the patient the relevant PAC Providers quality and resource use measures on Medicare Compare web site via computer based on patient's goals of care and treatment preferences. Questions regarding selection process were answered.      Teach Back Method used with pt regarding care plan and discharge planning.  Patient verbalized understanding of the plan of care and contribute to goal setting.       Patient preferences and discharge plan: pt planning 7E or [facility] when medically ready.   ERMA called wife, she does not understand why she cannot visit pt or why he would not return home at discharge. ERMA very brief explaining the hospital  needs his documents and asked that she meet me in the lobby. Wife hung up on sw.   SW called [place] [employer], officer recommends pt meet the police at his residence when he is discharged to get whatever belongs to him. The proceed to [facility].    Electronically signed by JOEL Padilla on 10/8/2024 at 2:30 PM

## 2024-10-08 NOTE — PROGRESS NOTES
After extubation,  used for conversation. Patient stated he wanted to explain the situation. Patient went on to say that him and his wife have been having marital issues and that his wife has been stating she is going to file for a divorce. Patient very tearful, stating he really loves his wife, but that she does not love him anymore.He states he drank bleach to intend to die, not to come to the hospital. He states that his wife has been hiding his forms that he needs to apply for a green card. His has currently  so is not currently employed. He has no other family around [place], he and his wife recently moved here from [place] and does not have any other family/friends around. He does not feel safe going home due to possibly feeling suicidal again. Patient also stated he did not want his wife to be updated on condition and would not like her to be able to visit. This RN verbalized with  and clarified, which patient stated again his wishes to not have her to be in contact with him. This RN verabalized understanding, stating we would make sure he is safe upon discharge, and contacted Stella, , social work consult placed.

## 2024-10-08 NOTE — PLAN OF CARE
Problem: Respiratory - Adult  Goal: Achieves optimal ventilation and oxygenation  10/8/2024 0735 by Kwesi Bond, CARLITO  Outcome: Progressing                                                Patient Weaning Progress    The patient's vent settings was able to be weaned this shift.      Ventilator settings that were weaned              [x] Mode   [x] Pressure support weaned   [] Fio2 weaned   [] Peep weaned      Spontaneous weaning trial  was attempted.       The patient was able to tolerate SBT.   RSBI  was 22 with EtCO2 of 57 and SpO2 of 100 on 21% FiO2.  Spontanteous VT was 525 and RR 11 breaths/min.  The patient was on SBT for 15 minutes     Evac tube was  hooked up with continuous low suction(20-30mmHg)      Cuff was  deflated to determine cuff leak. A leak  was detected.     Unable to get agreement for goals because no family is present and patient cannot respond.

## 2024-10-08 NOTE — PROGRESS NOTES
CRITICAL CARE H&P NOTE      Patient:  James Elminice    Unit/Bed:4D-12/012-A  YOB: 1985  MRN: [id number]   PCP: No primary care provider on file.  Date of Admission: 10/7/2024    Chief Complaint:- Toxic Ingestion      Assessment and Plan:    Ingestion of toxic substance, self harm: Patient presented to the ED after ingestion of breath.  EMS found stain removal powder next to him as well as powder all over him.  Patient came in with frothy sputum with blood foaming from mouth.  Patient was agitated and combative.  Patient was intubated in the ED primary prevention and Poison control reach out to who suggested supportive measures.  Patient given OG, output so far has been 500 mL of german brown liquid.  H&H trending has remained stable overnight.  Patient also had an increased AST and salicylic acid level suggesting concomitant ingestion.  Patient does not have a history of self-harm or suicide attempts previously.  EGD done by Gastroenterology, Dr. Fsiher showed erosive gastritis, no ulceration in the esophagus or duodenum. Patient on Protonix for GI support and prevention of emesis which can result in possible perforation, to be continued, patient is currently on PCV+ mode and has to be weaned off pressure support, can be extubated today. Patient also on propofol and fentanyl 20 mcg each, Precedex added overnight due to agitation, on 1 mcg, to be weaned off.   Erosive Gastritis: Esophagogastroduodenoscopy done by gastroenterology on 10/7/2024 showed severe erosive gastritis, normal esophagus and duodenum with no significant ulcerations.  Lactic acid trend went from 2.9 to 1.5 with Amylase 21. Patient to be continued on Protonix and can be transitioned to clear liquids today.  Hypotension: Patient has been hypotensive with pressures, he was started on Levophed and there is currently on 2 mcg/min.  Current pressures 105/67 with MAP of 78.  Patient is sedated and is on multiple medications Fentanyl,  radiographs, tracings, PFTs, and imaging are personally viewed and interpreted unless otherwise noted).   Sodium 146, Potassium 4.7, Chloride 110, BUN 10, Cr 0.8, Magnesium 2.3, Calcium 8.0, Lactic acid 1.5, Amylase 21  WBC 22.3, Neutros 18.7, HgB 14.2, Hct 44.4, Plt 274  Telemetry shows sinus rhythm  EGD showed erosive gastritis with no evidence of ulceration in esophagus or duodenum        Seen with multidisciplinary ICU team .  Meets Continued ICU Level Care Criteria:    [x] Yes   [] No - Transfer Planned to listed location:  [] HOSPITALIST CONTACTED-      Case and plan discussed with Dr. Mayorga.        Electronically signed by Osiel Perez MD  CRITICAL CARE SPECIALIST   Patient seen by me including key components of medical care.  Case discussed with resident physician.  Did well on SBT.  Extubated.  Stop pressors.  Transfer to medical floor.  Italicized bold font, if present,  represents changes to the note made by me.  CC time 35 minutes.  Time was discontiguous. Time does not include procedure. Time does include my direct assessment of the patient and coordination of care.  Time represents more than 50% of the time involved with patient care by the CC team.  Electronically signed by Ras Mayorga MD.

## 2024-10-08 NOTE — PROGRESS NOTES
Pt was in bed as his medical team were with him. He does not speak English and communication was with aid of and  via an electronic machine. He was so agitated and does not want prayer. He was wished well.   10/08/24 1413   Encounter Summary   Encounter Overview/Reason Initial Encounter   Service Provided For Patient   Referral/Consult From Rounding   Complexity of Encounter Low   Begin Time 0959   End Time  0956   Total Time Calculated 1437 min   Spiritual/Emotional needs   Type Spiritual Support   Assessment/Intervention/Outcome   Assessment Anxious;Fearful

## 2024-10-08 NOTE — PLAN OF CARE
Problem: Discharge Planning  Goal: Discharge to home or other facility with appropriate resources  10/8/2024 0006 by Betsy Shetty RN  Outcome: Progressing  Flowsheets (Taken 10/7/2024 1200 by Trinidad Phoenix RN)  Discharge to home or other facility with appropriate resources:   Identify barriers to discharge with patient and caregiver   Arrange for needed discharge resources and transportation as appropriate   Identify discharge learning needs (meds, wound care, etc)   Arrange for interpreters to assist at discharge as needed   Refer to discharge planning if patient needs post-hospital services based on physician order or complex needs related to functional status, cognitive ability or social support system  10/7/2024 1553 by Trinidad Phoenix, RN  Outcome: Progressing  Flowsheets (Taken 10/7/2024 1200)  Discharge to home or other facility with appropriate resources:   Identify barriers to discharge with patient and caregiver   Arrange for needed discharge resources and transportation as appropriate   Identify discharge learning needs (meds, wound care, etc)   Arrange for interpreters to assist at discharge as needed   Refer to discharge planning if patient needs post-hospital services based on physician order or complex needs related to functional status, cognitive ability or social support system     Problem: Pain  Goal: Verbalizes/displays adequate comfort level or baseline comfort level  10/8/2024 0006 by Betsy Shetty, RN  Outcome: Progressing  Flowsheets (Taken 10/8/2024 0006)  Verbalizes/displays adequate comfort level or baseline comfort level:   Encourage patient to monitor pain and request assistance   Administer analgesics based on type and severity of pain and evaluate response   Assess pain using appropriate pain scale   Implement non-pharmacological measures as appropriate and evaluate response  10/7/2024 1553 by Trinidad Phoenix RN  Outcome: Progressing     Problem: Safety - Adult  Goal:

## 2024-10-08 NOTE — SIGNIFICANT EVENT
Patient considered appropriate for stepdown.    Bed assigned 4K04.    Handoff provided to Hospitalist KIRILL, Royal Guzman    Electronically signed by Fredrick Samayoa MD on 10/8/24 at 6:20 PM EDT

## 2024-10-08 NOTE — CONSULTS
Session ID: [id number]  Language: N[place]   ID: #[id number]   Name: Jose [Normal Appearance] : normal appearance [Well Groomed] : well groomed [General Appearance - In No Acute Distress] : no acute distress [Edema] : no peripheral edema [Respiration, Rhythm And Depth] : normal respiratory rhythm and effort [Exaggerated Use Of Accessory Muscles For Inspiration] : no accessory muscle use [Abdomen Soft] : soft [Abdomen Tenderness] : non-tender [Costovertebral Angle Tenderness] : no ~M costovertebral angle tenderness [Urinary Bladder Findings] : the bladder was normal on palpation [Normal Station and Gait] : the gait and station were normal for the patient's age [] : no rash [No Focal Deficits] : no focal deficits [Oriented To Time, Place, And Person] : oriented to person, place, and time [Affect] : the affect was normal [Mood] : the mood was normal [No Palpable Adenopathy] : no palpable adenopathy [Urethral Meatus] : meatus normal [Penis Abnormality] : normal circumcised penis [Scrotum] : the scrotum was normal [de-identified] : empty prostatic fossa.

## 2024-10-08 NOTE — PLAN OF CARE
Problem: Discharge Planning  Goal: Discharge to home or other facility with appropriate resources  10/8/2024 0743 by Trinidad Phoenix RN  Outcome: Progressing  Flowsheets (Taken 10/8/2024 0743)  Discharge to home or other facility with appropriate resources:   Identify barriers to discharge with patient and caregiver   Arrange for needed discharge resources and transportation as appropriate   Identify discharge learning needs (meds, wound care, etc)   Refer to discharge planning if patient needs post-hospital services based on physician order or complex needs related to functional status, cognitive ability or social support system   Arrange for interpreters to assist at discharge as needed  Note: Speaks Creole, from home with wife. Will need psychiatric services before discharge     Problem: Pain  Goal: Verbalizes/displays adequate comfort level or baseline comfort level  10/8/2024 0743 by Trinidad Phoenix RN  Outcome: Progressing  Flowsheets (Taken 10/8/2024 0743)  Verbalizes/displays adequate comfort level or baseline comfort level:   Encourage patient to monitor pain and request assistance   Assess pain using appropriate pain scale   Administer analgesics based on type and severity of pain and evaluate response   Implement non-pharmacological measures as appropriate and evaluate response  Note: Cpot pain scale 0     Problem: Safety - Adult  Goal: Free from fall injury  10/8/2024 0743 by Trinidad Phoenix RN  Outcome: Progressing  Flowsheets (Taken 10/8/2024 0743)  Free From Fall Injury:   Instruct family/caregiver on patient safety   Based on caregiver fall risk screen, instruct family/caregiver to ask for assistance with transferring infant if caregiver noted to have fall risk factors     Problem: Respiratory - Adult  Goal: Achieves optimal ventilation and oxygenation  10/8/2024 0743 by Trinidad Phoenix, RN  Outcome: Progressing  Flowsheets (Taken 10/8/2024 0743)  Achieves optimal ventilation and oxygenation:

## 2024-10-08 NOTE — OP NOTE
erosions in the esophagus noted.  Air was withdrawn as the scope was removed.  The patient tolerated the procedure well without any immediate complications.  Vitals including blood pressure, heart rate, and pulse ox were stable during the procedure and after the procedure.    IMPRESSION:  Esophagogastroduodenoscopy to 2nd portion of duodenum.  Normal duodenum.  Normal esophagus, erosive gastritis.  No evidence of any significant ulcerations noted.    RECOMMENDATIONS:  Continue Protonix IV.  Okay to start clear liquids.  Okay to extubate the patient.          SCHUYLER HOLMAN MD      D:  10/07/2024 17:10:34     T:  10/08/2024 00:23:42     MARITO/EILEEN  Job #:  [id number]     Doc#:  [id number]    CC:   MD Ras Johnson MD

## 2024-10-09 LAB
ANION GAP SERPL CALC-SCNC: 10 MEQ/L (ref 8–16)
BASOPHILS ABSOLUTE: 0 THOU/MM3 (ref 0–0.1)
BASOPHILS NFR BLD AUTO: 0.2 %
BUN SERPL-MCNC: 13 MG/DL (ref 7–22)
CALCIUM SERPL-MCNC: 7.8 MG/DL (ref 8.5–10.5)
CHLORIDE SERPL-SCNC: 105 MEQ/L (ref 98–111)
CO2 SERPL-SCNC: 26 MEQ/L (ref 23–33)
CREAT SERPL-MCNC: 0.8 MG/DL (ref 0.4–1.2)
DEPRECATED RDW RBC AUTO: 49.9 FL (ref 35–45)
EOSINOPHIL NFR BLD AUTO: 0.3 %
EOSINOPHILS ABSOLUTE: 0.1 THOU/MM3 (ref 0–0.4)
ERYTHROCYTE [DISTWIDTH] IN BLOOD BY AUTOMATED COUNT: 13.8 % (ref 11.5–14.5)
GFR SERPL CREATININE-BSD FRML MDRD: > 90 ML/MIN/1.73M2
GLUCOSE SERPL-MCNC: 96 MG/DL (ref 70–108)
HCT VFR BLD AUTO: 36.5 % (ref 42–52)
HGB BLD-MCNC: 12.4 GM/DL (ref 14–18)
IMM GRANULOCYTES # BLD AUTO: 0.76 THOU/MM3 (ref 0–0.07)
IMM GRANULOCYTES NFR BLD AUTO: 3.8 %
LYMPHOCYTES ABSOLUTE: 1.6 THOU/MM3 (ref 1–4.8)
LYMPHOCYTES NFR BLD AUTO: 8.1 %
MCH RBC QN AUTO: 33.4 PG (ref 26–33)
MCHC RBC AUTO-ENTMCNC: 34 GM/DL (ref 32.2–35.5)
MCV RBC AUTO: 98.4 FL (ref 80–94)
MONOCYTES ABSOLUTE: 1.3 THOU/MM3 (ref 0.4–1.3)
MONOCYTES NFR BLD AUTO: 6.3 %
NEUTROPHILS ABSOLUTE: 16.2 THOU/MM3 (ref 1.8–7.7)
NEUTROPHILS NFR BLD AUTO: 81.3 %
NRBC BLD AUTO-RTO: 0 /100 WBC
PLATELET # BLD AUTO: 213 THOU/MM3 (ref 130–400)
PMV BLD AUTO: 10.4 FL (ref 9.4–12.4)
POTASSIUM SERPL-SCNC: 3.7 MEQ/L (ref 3.5–5.2)
PROCALCITONIN SERPL IA-MCNC: 2.79 NG/ML (ref 0.01–0.09)
RBC # BLD AUTO: 3.71 MILL/MM3 (ref 4.7–6.1)
SCAN OF BLOOD SMEAR: NORMAL
SODIUM SERPL-SCNC: 141 MEQ/L (ref 135–145)
WBC # BLD AUTO: 19.9 THOU/MM3 (ref 4.8–10.8)

## 2024-10-09 PROCEDURE — 2580000003 HC RX 258

## 2024-10-09 PROCEDURE — 6360000002 HC RX W HCPCS

## 2024-10-09 PROCEDURE — 6370000000 HC RX 637 (ALT 250 FOR IP): Performed by: NURSE PRACTITIONER

## 2024-10-09 PROCEDURE — 80048 BASIC METABOLIC PNL TOTAL CA: CPT

## 2024-10-09 PROCEDURE — 36415 COLL VENOUS BLD VENIPUNCTURE: CPT

## 2024-10-09 PROCEDURE — 85025 COMPLETE CBC W/AUTO DIFF WBC: CPT

## 2024-10-09 PROCEDURE — 6360000002 HC RX W HCPCS: Performed by: EMERGENCY MEDICINE

## 2024-10-09 PROCEDURE — 2060000000 HC ICU INTERMEDIATE R&B

## 2024-10-09 PROCEDURE — 84145 PROCALCITONIN (PCT): CPT

## 2024-10-09 PROCEDURE — 99233 SBSQ HOSP IP/OBS HIGH 50: CPT | Performed by: NURSE PRACTITIONER

## 2024-10-09 PROCEDURE — 2580000003 HC RX 258: Performed by: EMERGENCY MEDICINE

## 2024-10-09 RX ORDER — POLYETHYLENE GLYCOL 3350 17 G/17G
17 POWDER, FOR SOLUTION ORAL DAILY PRN
Status: DISCONTINUED | OUTPATIENT
Start: 2024-10-09 | End: 2024-10-10

## 2024-10-09 RX ADMIN — PANTOPRAZOLE SODIUM 8 MG/HR: 40 INJECTION, POWDER, FOR SOLUTION INTRAVENOUS at 04:24

## 2024-10-09 RX ADMIN — POLYETHYLENE GLYCOL 3350 17 G: 17 POWDER, FOR SOLUTION ORAL at 14:22

## 2024-10-09 RX ADMIN — SODIUM CHLORIDE: 9 INJECTION, SOLUTION INTRAVENOUS at 15:28

## 2024-10-09 RX ADMIN — PIPERACILLIN AND TAZOBACTAM 3375 MG: 3; .375 INJECTION, POWDER, FOR SOLUTION INTRAVENOUS at 00:59

## 2024-10-09 RX ADMIN — PIPERACILLIN AND TAZOBACTAM 3375 MG: 3; .375 INJECTION, POWDER, FOR SOLUTION INTRAVENOUS at 10:10

## 2024-10-09 RX ADMIN — PANTOPRAZOLE SODIUM 8 MG/HR: 40 INJECTION, POWDER, FOR SOLUTION INTRAVENOUS at 00:58

## 2024-10-09 RX ADMIN — PANTOPRAZOLE SODIUM 8 MG/HR: 40 INJECTION, POWDER, FOR SOLUTION INTRAVENOUS at 15:26

## 2024-10-09 RX ADMIN — PIPERACILLIN AND TAZOBACTAM 3375 MG: 3; .375 INJECTION, POWDER, FOR SOLUTION INTRAVENOUS at 16:58

## 2024-10-09 RX ADMIN — SODIUM CHLORIDE, PRESERVATIVE FREE 10 ML: 5 INJECTION INTRAVENOUS at 10:00

## 2024-10-09 ASSESSMENT — PAIN DESCRIPTION - PAIN TYPE: TYPE: ACUTE PAIN

## 2024-10-09 ASSESSMENT — PAIN DESCRIPTION - FREQUENCY: FREQUENCY: INTERMITTENT

## 2024-10-09 ASSESSMENT — PAIN SCALES - GENERAL: PAINLEVEL_OUTOF10: 3

## 2024-10-09 ASSESSMENT — PAIN - FUNCTIONAL ASSESSMENT: PAIN_FUNCTIONAL_ASSESSMENT: ACTIVITIES ARE NOT PREVENTED

## 2024-10-09 ASSESSMENT — PAIN DESCRIPTION - DESCRIPTORS: DESCRIPTORS: CRAMPING

## 2024-10-09 ASSESSMENT — PAIN DESCRIPTION - ORIENTATION: ORIENTATION: MID

## 2024-10-09 ASSESSMENT — PAIN DESCRIPTION - ONSET: ONSET: ON-GOING

## 2024-10-09 ASSESSMENT — PAIN DESCRIPTION - LOCATION: LOCATION: ABDOMEN

## 2024-10-09 NOTE — CARE COORDINATION
10/9/24, 7:21 AM EDT    DISCHARGE BARRIERS        Patient transferred to [facility]. Report given to unit SW, Ema JARQUIN, regarding discharge plan for this patient.

## 2024-10-09 NOTE — CARE COORDINATION
10/9/24, 3:31 PM EDT    DISCHARGE PLANNING EVALUATION     Call to [facility], if pt would dc there, SW did inquire about translators, reports they use [employer] translate.

## 2024-10-09 NOTE — PROGRESS NOTES
Reinforced suicide precautions with patient.  Reinforced that visitors will be screened and may be limited at the discretion of the nurse.  Visitor belongings are subject to be searched.  Belongings may not be allowed into the patient room.    Reviewed any personal belongings from the previous shift believed to be a threat to patient safety removed from room.  Belongings removed include:  all belongings removed already   Placed in secure area  .    Room screened for safety, items removed to create a safe environment include:   Blood pressure cuff   Loose or extra cords, tubing (not of medical necessity)   Extra Linens   Telephone   Toiletries   Trash Liners   Patient's cell phone and charging cord (must be removed from room)      Safety tray ordered: yes    Expectations were discussed with sitter (unit support aide).  Sitter positioned near exit.  Sitter reminded that patient should be observed at all times including toileting and bathing.  Sitter has  documentation sheet.    Patient and sitter included in hourly rounds.

## 2024-10-09 NOTE — PROGRESS NOTES
This RN received phone call from Devorah, patient's wife. Devorah states she does not want to meet with  to bring papers because she does not want patient accusing her of stealing anything. She states that she is ok with patient coming with police to  papers and belongings at discharge.

## 2024-10-09 NOTE — PLAN OF CARE
Patient admitted on 10/7 following suicide attempt after ingestion of bleach.  His AST and salicylic acid level were also found to be elevated. On arrival, the patient was combative and agitated, foaming at the mouth, ultimately requiring intubation.  Poison control was contacted and suggested supportive care.  He has no previous suicide attempts.  GI was consulted and EGD was preformed on 10/7 by Dr. Fisher and found erosive gastritis.  He is on IV PPI.  The patient did develop some hypotension requiring Levophed that was thought to be sedation related.  He has been weaned off and MAP has maintained > 65.  He was successfully extubated on 10/8 and is doing well on room air.     Plan:  Continue PPI-GI following.   Psychiatry consulted and to see in AM now that patient has been extubated.  Will likely transfer to IP psychiatry when medically stable.      Hospitalist will assume care at this time,.      Reyna Maloney, CHERRI - CNP

## 2024-10-09 NOTE — CONSULTS
Department of Psychiatry   Psychiatric Assessment      Thank you very much for allowing us to participate in the care of this patient.      Reason for Consult:  post suicide attempt    HISTORY OF PRESENT ILLNESS:          The patient is a 39 y.o. male without a significant past medical history who is admitted s/p suicide attempt from drinking bleach.     Patient is [place] Creole.  A  was used during the encounter.  Patient was informed on the use of a  and was agreeable to this.  During the encounter, the communication barrier between the  service and his physician was apparent in the the 's ability to properly translate the questions that were asked.  More specifically on the term \"guilt \"as well as other depression symptoms screening questions. Sitter and nursing student were present in the room during this encounter as well    Patient states that he drank the bleach in order to prove to his wife how much he loved her.  He states that he wanted her to understand that he was wanting to die for her.  He notes that he first met her when he was 7 in [place], but then she immigrated to [place] before him.  He left his children in [place] behind 1 who is 13 years old and the other who is 4 years old, for his wife.  He states that they reconnected through Facebook 2 years ago and they got together shortly after.  They have been  for 6 months total.    He states that she does not love him in the same way that he loves her.  He additionally spoke about the wife's cousin as a potential third party in this marriage seemingly to be almost a  between the 2 of them.  There was much contention about his work visa and papers, as the wife is an established citizen here in the [place] but he is not currently.  He states that he wanted her to know that he loved her beyond her citizenship status.    He does admit increasing sleepless nights as he thinks about her and the  disorder present at this time, will need follow-up with psychiatry or therapy for further diagnosis      Stressors     Severity of stressors is moderate  Source of stressors include:  Other psychosocial and environmental stressors    PLAN:      Continue current medical management  Recommending admission to 7E psychiatric unit once stable for medical discharge  Additional recommendations will follow the clinical course.       Thank you very much for allowing us to participate in the care of this patient.  Time spent 45 min.      Electronically signed by Seth Whittington DO on 10/9/24 at 1:07 PM EDT

## 2024-10-09 NOTE — PLAN OF CARE
Problem: Discharge Planning  Goal: Discharge to home or other facility with appropriate resources  Outcome: Progressing     Problem: Pain  Goal: Verbalizes/displays adequate comfort level or baseline comfort level  Outcome: Progressing     Problem: Safety - Adult  Goal: Free from fall injury  Outcome: Progressing     Problem: Respiratory - Adult  Goal: Achieves optimal ventilation and oxygenation  Outcome: Progressing     Problem: Skin/Tissue Integrity  Goal: Absence of new skin breakdown  Description: 1.  Monitor for areas of redness and/or skin breakdown  2.  Assess vascular access sites hourly  3.  Every 4-6 hours minimum:  Change oxygen saturation probe site  4.  Every 4-6 hours:  If on nasal continuous positive airway pressure, respiratory therapy assess nares and determine need for appliance change or resting period.  Outcome: Progressing     Problem: Safety - Medical Restraint  Goal: Remains free of injury from restraints (Restraint for Interference with Medical Device)  Description: INTERVENTIONS:  1. Determine that other, less restrictive measures have been tried or would not be effective before applying the restraint  2. Evaluate the patient's condition at the time of restraint application  3. Inform patient/family regarding the reason for restraint  4. Q2H: Monitor safety, psychosocial status, comfort, nutrition and hydration  Outcome: Progressing     Problem: Neurosensory - Adult  Goal: Achieves stable or improved neurological status  Outcome: Progressing     Problem: Skin/Tissue Integrity - Adult  Goal: Skin integrity remains intact  Outcome: Progressing  Goal: Incisions, wounds, or drain sites healing without S/S of infection  Outcome: Progressing  Goal: Oral mucous membranes remain intact  Outcome: Progressing     Problem: Gastrointestinal - Adult  Goal: Minimal or absence of nausea and vomiting  Outcome: Progressing  Goal: Maintains or returns to baseline bowel function  Outcome: Progressing

## 2024-10-09 NOTE — PROGRESS NOTES
Reinforced suicide precautions with patient.  Reinforced that visitors will be screened and may be limited at the discretion of the nurse.  Visitor belongings are subject to be searched.  Belongings may not be allowed into the patient room.    Reviewed any personal belongings from the previous shift believed to be a threat to patient safety removed from room.  Belongings removed include: potentially lethal items   Placed in secure area outside of the room .    Room screened for safety, items removed to create a safe environment include:   Blood pressure cuff   Loose or extra cords, tubing (not of medical necessity)   Extra Linens   Telephone   Toiletries   Trash Liners   Patient's cell phone and charging cord (must be removed from room)      Safety tray ordered: yes    Expectations were discussed with sitter (unit support aide).  Sitter positioned near exit.  Sitter reminded that patient should be observed at all times including toileting and bathing.  Sitter has  documentation sheet.    Patient and sitter included in hourly rounds.

## 2024-10-09 NOTE — PLAN OF CARE
Problem: Discharge Planning  Goal: Discharge to home or other facility with appropriate resources  10/8/2024 2126 by José Miguel Qureshi RN  Outcome: Progressing  Flowsheets (Taken 10/8/2024 2126)  Discharge to home or other facility with appropriate resources:   Arrange for needed discharge resources and transportation as appropriate   Identify barriers to discharge with patient and caregiver     Problem: Pain  Goal: Verbalizes/displays adequate comfort level or baseline comfort level  10/8/2024 2126 by José Miguel Qureshi RN  Outcome: Progressing  Flowsheets (Taken 10/8/2024 2126)  Verbalizes/displays adequate comfort level or baseline comfort level:   Encourage patient to monitor pain and request assistance   Assess pain using appropriate pain scale     Problem: Safety - Adult  Goal: Free from fall injury  10/8/2024 2126 by José Miguel Qureshi RN  Outcome: Progressing  Flowsheets (Taken 10/8/2024 2126)  Free From Fall Injury: Instruct family/caregiver on patient safety     Problem: Respiratory - Adult  Goal: Achieves optimal ventilation and oxygenation  10/8/2024 2126 by José Miguel Qureshi RN  Outcome: Progressing  Flowsheets (Taken 10/8/2024 2126)  Achieves optimal ventilation and oxygenation:   Assess for changes in respiratory status   Assess for changes in mentation and behavior     Problem: Skin/Tissue Integrity  Goal: Absence of new skin breakdown  Description: 1.  Monitor for areas of redness and/or skin breakdown  2.  Assess vascular access sites hourly  3.  Every 4-6 hours minimum:  Change oxygen saturation probe site  4.  Every 4-6 hours:  If on nasal continuous positive airway pressure, respiratory therapy assess nares and determine need for appliance change or resting period.  10/8/2024 2126 by José Miguel Qureshi RN  Outcome: Progressing     Problem: Safety - Medical Restraint  Goal: Remains free of injury from restraints (Restraint for Interference with Medical Device)  Description: INTERVENTIONS:  1. Determine that  ANIYA Valdez  Incisions, Wounds, or Drain Sites Healing Without Sign and Symptoms of Infection:   ADMISSION and DAILY: Assess and document risk factors for pressure ulcer development   TWICE DAILY: Assess and document skin integrity   TWICE DAILY: Assess and document dressing/incision, wound bed, drain sites and surrounding tissue  Taken 10/8/2024 0747 by Trinidad Phoenix RN  Incisions, Wounds, or Drain Sites Healing Without Sign and Symptoms of Infection:   ADMISSION and DAILY: Assess and document risk factors for pressure ulcer development   TWICE DAILY: Assess and document skin integrity   TWICE DAILY: Assess and document dressing/incision, wound bed, drain sites and surrounding tissue   Initiate pressure ulcer prevention bundle as indicated     Problem: Skin/Tissue Integrity - Adult  Goal: Oral mucous membranes remain intact  10/8/2024 2126 by José Miguel Qureshi RN  Outcome: Progressing  Flowsheets  Taken 10/8/2024 2126 by José Miguel Qureshi RN  Oral Mucous Membranes Remain Intact:   Assess oral mucosa and hygiene practices   Implement preventative oral hygiene regimen  Taken 10/8/2024 0747 by Trinidad Phoenix RN  Oral Mucous Membranes Remain Intact:   Assess oral mucosa and hygiene practices   Implement oral medicated treatments as ordered   Implement preventative oral hygiene regimen     Problem: Gastrointestinal - Adult  Goal: Minimal or absence of nausea and vomiting  10/8/2024 2126 by José Miguel Qureshi RN  Outcome: Progressing  Flowsheets (Taken 10/8/2024 2126)  Minimal or absence of nausea and vomiting:   Administer IV fluids as ordered to ensure adequate hydration   Maintain NPO status until nausea and vomiting are resolved     Problem: Gastrointestinal - Adult  Goal: Maintains or returns to baseline bowel function  10/8/2024 2126 by José Miguel Qureshi RN  Outcome: Progressing  Flowsheets (Taken 10/8/2024 2126)  Maintains or returns to baseline bowel function:   Assess bowel function   Encourage oral fluids to ensure

## 2024-10-09 NOTE — PROGRESS NOTES
laying on the ground this morning and covered in bleach powder and having bleach mixed with the water in a cup nearby.  Patient does not have any previous history of depression, suicide or any mental illness.  Patient arrived agitated, altered with frothy bloody secretions from the mouth.  Patient was intubated in the ED for airway protection and OG tube was placed.  Poison control was reached out to and suggested supportive measures only.  Patient was admitted to the ICU for further observation.     10/08 - Patient seen and evaluated at bedside.  Sedated and intubated, OG has output of 500 mL.  Patient is unresponsive, shows no response to palpation or evidence of tenderness on chest or abdomen.  Spontaneous trial to be done today and patient likely eventually extubated.  Labs pending, patient on day 2 Zosyn and s/p EGD.       Subjective: Used  for visit. He reports epigastric pain when drinking clears. Pain is tolerable. No n/v. Denies CP/SOB. Sitter at bedside states he has been tearful.       Medications:  Reviewed    Infusion Medications    pantoprazole (PROTONIX) 80 mg in sodium chloride 0.9 % 100 mL infusion 8 mg/hr (10/09/24 0424)    sodium chloride      sodium chloride 100 mL/hr at 10/08/24 0742     Scheduled Medications    piperacillin-tazobactam  3,375 mg IntraVENous Q8H    sodium chloride flush  5-40 mL IntraVENous 2 times per day     PRN Meds: sodium chloride flush, sodium chloride      Intake/Output Summary (Last 24 hours) at 10/9/2024 1147  Last data filed at 10/9/2024 0912  Gross per 24 hour   Intake 835.19 ml   Output 1300 ml   Net -464.81 ml       Diet:  ADULT DIET; Clear Liquid; Safety Tray; Safety Tray (Disposables)    Exam:  BP 97/63   Pulse 76   Temp 98.2 °F (36.8 °C) (Oral)   Resp 18   Ht 1.676 m (5' 6\")   Wt 61.3 kg (135 lb 2.3 oz)   SpO2 100%   BMI 21.81 kg/m²     General appearance: No apparent distress, appears stated age and cooperative.  HEENT: Pupils equal, round, and  reactive to light. Conjunctivae/corneas clear.  Neck: Supple, with full range of motion. No jugular venous distention. Trachea midline.  Respiratory:  Normal respiratory effort. Clear to auscultation, bilaterally without Rales/Wheezes/Rhonchi.  Cardiovascular: Regular rate and rhythm with normal S1/S2 without murmurs, rubs or gallops.  Abdomen: Soft, epigastric tenderness, non-distended with normal bowel sounds.  Musculoskeletal: passive and active ROM x 4 extremities.  Skin: Skin color, texture, turgor normal.  No rashes or lesions.  Neurologic:  Neurovascularly intact without any focal sensory/motor deficits. Cranial nerves: II-XII intact, grossly non-focal.  Psychiatric: Alert and oriented, thought content appropriate, normal insight  Capillary Refill: Brisk,< 3 seconds   Peripheral Pulses: +2 palpable, equal bilaterally       Labs:   Recent Labs     10/07/24  0704 10/07/24  1007 10/07/24  1907 10/08/24  0600 10/09/24  0901   WBC 22.3*  --   --  21.8* 19.9*   HGB 16.0   < > 14.2 13.8* 12.4*   HCT 49.9   < > 44.4 43.0 36.5*     --   --  174 213    < > = values in this interval not displayed.     Recent Labs     10/07/24  0704 10/08/24  0600 10/09/24  0901   * 142 141   K 4.7 3.8 3.7    108 105   CO2 23 25 26   BUN 10 14 13   CREATININE 0.8 0.9 0.8   CALCIUM 8.0* 7.6* 7.8*     Recent Labs     10/07/24  0704   AST 48*   ALT 27   BILIDIR 0.2   BILITOT 0.8   ALKPHOS 105     Recent Labs     10/07/24  0704   INR 1.06     No results for input(s): \"CKTOTAL\", \"TROPONINI\" in the last 72 hours.    Urinalysis:      Lab Results   Component Value Date/Time    NITRU NEGATIVE 10/07/2024 04:50 PM    BLOODU NEGATIVE 10/07/2024 04:50 PM    GLUCOSEU NEGATIVE 10/07/2024 04:50 PM       Radiology:  XR CHEST PORTABLE   Final Result      No acute disease.      Endotracheal tube, well-positioned.      Nasogastric tube tip is in the stomach. The 1st side-hole is at the    gastroesophageal junction. Recommend 7 cm

## 2024-10-10 LAB
BASOPHILS ABSOLUTE: 0 THOU/MM3 (ref 0–0.1)
BASOPHILS NFR BLD AUTO: 0.2 %
DEPRECATED RDW RBC AUTO: 48.8 FL (ref 35–45)
EOSINOPHIL NFR BLD AUTO: 0.9 %
EOSINOPHILS ABSOLUTE: 0.1 THOU/MM3 (ref 0–0.4)
ERYTHROCYTE [DISTWIDTH] IN BLOOD BY AUTOMATED COUNT: 13.8 % (ref 11.5–14.5)
HCT VFR BLD AUTO: 33.8 % (ref 42–52)
HGB BLD-MCNC: 11.2 GM/DL (ref 14–18)
IMM GRANULOCYTES # BLD AUTO: 0.08 THOU/MM3 (ref 0–0.07)
IMM GRANULOCYTES NFR BLD AUTO: 0.5 %
LYMPHOCYTES ABSOLUTE: 2.2 THOU/MM3 (ref 1–4.8)
LYMPHOCYTES NFR BLD AUTO: 14.9 %
MCH RBC QN AUTO: 32.5 PG (ref 26–33)
MCHC RBC AUTO-ENTMCNC: 33.1 GM/DL (ref 32.2–35.5)
MCV RBC AUTO: 98 FL (ref 80–94)
MONOCYTES ABSOLUTE: 1.1 THOU/MM3 (ref 0.4–1.3)
MONOCYTES NFR BLD AUTO: 7 %
NEUTROPHILS ABSOLUTE: 11.5 THOU/MM3 (ref 1.8–7.7)
NEUTROPHILS NFR BLD AUTO: 76.5 %
NRBC BLD AUTO-RTO: 0 /100 WBC
PLATELET # BLD AUTO: 202 THOU/MM3 (ref 130–400)
PMV BLD AUTO: 10.1 FL (ref 9.4–12.4)
RBC # BLD AUTO: 3.45 MILL/MM3 (ref 4.7–6.1)
WBC # BLD AUTO: 15 THOU/MM3 (ref 4.8–10.8)

## 2024-10-10 PROCEDURE — 2060000000 HC ICU INTERMEDIATE R&B

## 2024-10-10 PROCEDURE — 2580000003 HC RX 258

## 2024-10-10 PROCEDURE — 6370000000 HC RX 637 (ALT 250 FOR IP): Performed by: NURSE PRACTITIONER

## 2024-10-10 PROCEDURE — 85025 COMPLETE CBC W/AUTO DIFF WBC: CPT

## 2024-10-10 PROCEDURE — 6360000002 HC RX W HCPCS

## 2024-10-10 PROCEDURE — 36415 COLL VENOUS BLD VENIPUNCTURE: CPT

## 2024-10-10 PROCEDURE — 99233 SBSQ HOSP IP/OBS HIGH 50: CPT | Performed by: NURSE PRACTITIONER

## 2024-10-10 RX ORDER — SENNOSIDES A AND B 8.6 MG/1
1 TABLET, FILM COATED ORAL NIGHTLY
Status: DISCONTINUED | OUTPATIENT
Start: 2024-10-10 | End: 2024-10-12 | Stop reason: HOSPADM

## 2024-10-10 RX ORDER — POLYETHYLENE GLYCOL 3350 17 G/17G
17 POWDER, FOR SOLUTION ORAL DAILY
Status: DISCONTINUED | OUTPATIENT
Start: 2024-10-10 | End: 2024-10-12 | Stop reason: HOSPADM

## 2024-10-10 RX ORDER — PANTOPRAZOLE SODIUM 40 MG/1
40 TABLET, DELAYED RELEASE ORAL
Status: DISCONTINUED | OUTPATIENT
Start: 2024-10-10 | End: 2024-10-12 | Stop reason: HOSPADM

## 2024-10-10 RX ORDER — DOCUSATE SODIUM 100 MG/1
100 CAPSULE, LIQUID FILLED ORAL 2 TIMES DAILY
Status: DISCONTINUED | OUTPATIENT
Start: 2024-10-10 | End: 2024-10-12 | Stop reason: HOSPADM

## 2024-10-10 RX ADMIN — SENNOSIDES 8.6 MG: 8.6 TABLET, FILM COATED ORAL at 21:56

## 2024-10-10 RX ADMIN — PIPERACILLIN AND TAZOBACTAM 3375 MG: 3; .375 INJECTION, POWDER, FOR SOLUTION INTRAVENOUS at 02:21

## 2024-10-10 RX ADMIN — POLYETHYLENE GLYCOL 3350 17 G: 17 POWDER, FOR SOLUTION ORAL at 11:46

## 2024-10-10 RX ADMIN — PANTOPRAZOLE SODIUM 40 MG: 40 TABLET, DELAYED RELEASE ORAL at 16:19

## 2024-10-10 RX ADMIN — DOCUSATE SODIUM 100 MG: 100 CAPSULE, LIQUID FILLED ORAL at 11:46

## 2024-10-10 RX ADMIN — SODIUM CHLORIDE, PRESERVATIVE FREE 10 ML: 5 INJECTION INTRAVENOUS at 21:55

## 2024-10-10 RX ADMIN — DOCUSATE SODIUM 100 MG: 100 CAPSULE, LIQUID FILLED ORAL at 21:56

## 2024-10-10 NOTE — PLAN OF CARE
(Taken 10/9/2024 2240)  Skin Integrity Remains Intact:   Monitor for areas of redness and/or skin breakdown   Assess vascular access sites hourly     Problem: Skin/Tissue Integrity - Adult  Goal: Incisions, wounds, or drain sites healing without S/S of infection  10/9/2024 2241 by Noemy Kathleen RN  Outcome: Progressing  Flowsheets (Taken 10/9/2024 2240)  Incisions, Wounds, or Drain Sites Healing Without Sign and Symptoms of Infection:   ADMISSION and DAILY: Assess and document risk factors for pressure ulcer development   TWICE DAILY: Assess and document dressing/incision, wound bed, drain sites and surrounding tissue   TWICE DAILY: Assess and document skin integrity     Problem: Skin/Tissue Integrity - Adult  Goal: Oral mucous membranes remain intact  10/9/2024 2241 by Noemy Kathleen RN  Outcome: Progressing  Flowsheets (Taken 10/9/2024 2240)  Oral Mucous Membranes Remain Intact:   Assess oral mucosa and hygiene practices   Implement preventative oral hygiene regimen     Problem: Gastrointestinal - Adult  Goal: Minimal or absence of nausea and vomiting  10/9/2024 2241 by Noemy Kathleen RN  Outcome: Progressing  Flowsheets (Taken 10/8/2024 2126 by José Miguel Qureshi RN)  Minimal or absence of nausea and vomiting:   Administer IV fluids as ordered to ensure adequate hydration   Maintain NPO status until nausea and vomiting are resolved     Problem: Gastrointestinal - Adult  Goal: Maintains or returns to baseline bowel function  10/9/2024 2241 by Noemy Kathleen RN  Outcome: Progressing  Flowsheets (Taken 10/8/2024 2126 by José Miguel Qureshi, RN)  Maintains or returns to baseline bowel function:   Assess bowel function   Encourage oral fluids to ensure adequate hydration     Problem: Metabolic/Fluid and Electrolytes - Adult  Goal: Hemodynamic stability and optimal renal function maintained  10/9/2024 2241 by Noemy Kathleen RN  Outcome: Progressing  Flowsheets (Taken 10/8/2024 2126 by José Miguel Qureshi RN)  Hemodynamic stability and  optimal renal function maintained:   Monitor labs and assess for signs and symptoms of volume excess or deficit   Monitor intake, output and patient weight     Problem: Nutrition Deficit:  Goal: Optimize nutritional status  10/9/2024 2241 by Noemy Kathleen RN  Outcome: Progressing  Flowsheets (Taken 10/8/2024 2126 by José Miguel Qureshi, RN)  Nutrient intake appropriate for improving, restoring, or maintaining nutritional needs:   Assess nutritional status and recommend course of action   Monitor oral intake, labs, and treatment plans     Problem: Genitourinary - Adult  Goal: Absence of urinary retention  10/9/2024 2241 by Noemy Kathleen RN  Outcome: Progressing  Flowsheets (Taken 10/8/2024 2126 by José Miguel Qureshi, RN)  Absence of urinary retention:   Assess patient’s ability to void and empty bladder   Monitor intake/output and perform bladder scan as needed     Problem: Self Harm/Suicidality  Goal: Will have no self-injury during hospital stay  Description: INTERVENTIONS:  1.  Ensure constant observer at bedside with Q15M safety checks  2.  Maintain a safe environment  3.  Secure patient belongings  4.  Ensure family/visitors adhere to safety recommendations  5.  Ensure safety tray has been added to patient's diet order  6.  Every shift and PRN: Re-assess suicidal risk via Frequent Screener    10/9/2024 2241 by Noemy Kathleen RN  Outcome: Progressing  Flowsheets (Taken 10/8/2024 2126 by José Miguel Qureshi, RN)  Will have no self-injury during hospital stay:   Ensure constant observer at bedside with Q15M safety checks   Maintain a safe environment   Secure patient belongings   Ensure family/visitors adhere to safety recommendations   Ensure safety tray has been added to patient's diet order     Problem: Safety - Medical Restraint  Goal: Remains free of injury from restraints (Restraint for Interference with Medical Device)  Description: INTERVENTIONS:  1. Determine that other, less restrictive measures have been tried or would

## 2024-10-10 NOTE — CARE COORDINATION
10/10/24, 7:19 AM EDT    DISCHARGE ON GOING EVALUATION    [facility] day: 3  Location: -04/004-A Reason for admit: Suicide attempt (HCC) [T14.91XA]  Upper GI bleeding [K92.2]  Ingestion of bleach, intentional self-harm, initial encounter ([facility]) [T54.92XA]  Ingestion of bleach, undetermined intent, initial encounter [T54.94XA]     Procedures: 10/7 Intubated   10/7 EGD: Erosive gastritis; no evidence of significant ulcerations noted  10/8 Extubated    Imaging since last note: No.    Barriers to Discharge: White count down to 15.0 today. Low grade temp 99.1 at 0300. Remains on IVF at 100/hr. IV Protonix and IV Zosyn. Clear liquid diet. Psych following and recommend Psych admission at discharge. 1:1 sitter and suicide precautions in place.     PCP: No primary care provider on file.  Readmission Risk Score: 6.2    Patient Goals/Plan/Treatment Preferences: From home w/wife. Does not want to go back with wife after discharged. SW consulted. Psych to see; likely 7E IP Psych at discharge.

## 2024-10-10 NOTE — PROGRESS NOTES
Hospitalist Progress Note    Patient:  James Straussminice      Unit/Bed:4K-04/004-A    YOB: 1985    MRN: [id number]       Acct: [id number]     PCP: No primary care provider on file.    Date of Admission: 10/7/2024    Assessment/Plan:    Suicide attempt with ingestion of toxic substance, self harm: Patient presented to the ED after ingestion of bleach  EMS found stain removal powder next to him as well as powder all over him.  Patient came in with frothy sputum with blood foaming from mouth.  Patient was agitated and combative on arrival.  He was intubated in the ED for airway protection. Poison control contacted who suggested supportive measures.  Also had an increased AST and salicylic acid level suggesting concomitant ingestion.  Patient does not have a history of self-harm or suicide attempts previously. He has been extubated. Agitation is resolved. UDS was negative. Continue 1:1 sitter. Psych recs for 7E admission once medically stable.   Erosive Gastritis: EGD 10/7/2024 showed severe erosive gastritis, normal esophagus and duodenum with no significant ulcerations.  Stop IV Protonix gtt and change to PO PPI BID. Tolerating clear liquid diet, increase to full liquids for lunch. If tolerates ok for regular with dinners. Stop IVF.   Constipation. Change Miralax to daily. Add Colace and Senna.  Leukocytosis, downward trend. Patient has white cell count of 22.3 with neutrophils 18.7 and monocytes 1.4, immature granulocytes 0.10 on arrival.  Repeat WBC today 15. PCT 2.79. Cultures NGTD.  Afebrile. Stop IV Zosyn. Repeat CBC in AM.  Acute Hypotension, resolved. IV pressers have been weaned  Lactic acidosis, resolved.   Hypernatremia, resolved.   Macrocytic anemia. Mild. Hbg 12.4.   DVT Prophylaxis: SCDs. Encourage ambulation.       Dispo: if tolerates advancement in diet to psych in AM.       Chief Complaint: suicide attempt    Hospital Course:    Patient has a history of-year-old with no past medical  history or record on file.  Patient presented to the ED via EMS after ingestion of Clorox bleach.  Patient's wife states that they had applied to threaten divorce when he threatened to kill himself and she found him laying on the ground this morning and covered in bleach powder and having bleach mixed with the water in a cup nearby.  Patient does not have any previous history of depression, suicide or any mental illness.  Patient arrived agitated, altered with frothy bloody secretions from the mouth.  Patient was intubated in the ED for airway protection and OG tube was placed.  Poison control was reached out to and suggested supportive measures only.  Patient was admitted to the ICU for further observation.     10/08 - Patient seen and evaluated at bedside.  Sedated and intubated, OG has output of 500 mL.  Patient is unresponsive, shows no response to palpation or evidence of tenderness on chest or abdomen.  Spontaneous trial to be done today and patient likely eventually extubated.  Labs pending, patient on day 2 Zosyn and s/p EGD.       Subjective: Used  for visit. No n/v. Still no BM. Stomach is \"bubbly\". Denies abdominal pain. Appetite ok. States multiple times with the  he is not crazy.      Medications:  Reviewed    Infusion Medications    sodium chloride       Scheduled Medications    pantoprazole  40 mg Oral BID AC    sodium chloride flush  5-40 mL IntraVENous 2 times per day     PRN Meds: polyethylene glycol, sodium chloride flush, sodium chloride      Intake/Output Summary (Last 24 hours) at 10/10/2024 1057  Last data filed at 10/10/2024 1030  Gross per 24 hour   Intake 440 ml   Output 1820 ml   Net -1380 ml       Diet:  ADULT DIET; Full Liquid; Safety Tray; Safety Tray (Disposables)    Exam:  /69   Pulse 79   Temp 98.6 °F (37 °C) (Oral)   Resp 16   Ht 1.676 m (5' 6\")   Wt 61.9 kg (136 lb 7.4 oz)   SpO2 100%   BMI 22.03 kg/m²     General appearance: No apparent distress,

## 2024-10-11 VITALS
DIASTOLIC BLOOD PRESSURE: 77 MMHG | HEART RATE: 66 BPM | RESPIRATION RATE: 18 BRPM | TEMPERATURE: 99.1 F | OXYGEN SATURATION: 100 % | BODY MASS INDEX: 21.93 KG/M2 | WEIGHT: 136.47 LBS | HEIGHT: 66 IN | SYSTOLIC BLOOD PRESSURE: 122 MMHG

## 2024-10-11 LAB
ANION GAP SERPL CALC-SCNC: 11 MEQ/L (ref 8–16)
BASOPHILS ABSOLUTE: 0 THOU/MM3 (ref 0–0.1)
BASOPHILS NFR BLD AUTO: 0.3 %
BUN SERPL-MCNC: 9 MG/DL (ref 7–22)
CALCIUM SERPL-MCNC: 7.9 MG/DL (ref 8.5–10.5)
CHLORIDE SERPL-SCNC: 106 MEQ/L (ref 98–111)
CO2 SERPL-SCNC: 24 MEQ/L (ref 23–33)
CREAT SERPL-MCNC: 0.7 MG/DL (ref 0.4–1.2)
DEPRECATED RDW RBC AUTO: 48.2 FL (ref 35–45)
EOSINOPHIL NFR BLD AUTO: 1.2 %
EOSINOPHILS ABSOLUTE: 0.1 THOU/MM3 (ref 0–0.4)
ERYTHROCYTE [DISTWIDTH] IN BLOOD BY AUTOMATED COUNT: 13.3 % (ref 11.5–14.5)
GFR SERPL CREATININE-BSD FRML MDRD: > 90 ML/MIN/1.73M2
GLUCOSE SERPL-MCNC: 102 MG/DL (ref 70–108)
HCT VFR BLD AUTO: 35.6 % (ref 42–52)
HGB BLD-MCNC: 11.8 GM/DL (ref 14–18)
IMM GRANULOCYTES # BLD AUTO: 0.04 THOU/MM3 (ref 0–0.07)
IMM GRANULOCYTES NFR BLD AUTO: 0.4 %
LYMPHOCYTES ABSOLUTE: 2 THOU/MM3 (ref 1–4.8)
LYMPHOCYTES NFR BLD AUTO: 19.2 %
MCH RBC QN AUTO: 32.7 PG (ref 26–33)
MCHC RBC AUTO-ENTMCNC: 33.1 GM/DL (ref 32.2–35.5)
MCV RBC AUTO: 98.6 FL (ref 80–94)
MONOCYTES ABSOLUTE: 0.9 THOU/MM3 (ref 0.4–1.3)
MONOCYTES NFR BLD AUTO: 8.9 %
NEUTROPHILS ABSOLUTE: 7.4 THOU/MM3 (ref 1.8–7.7)
NEUTROPHILS NFR BLD AUTO: 70 %
NRBC BLD AUTO-RTO: 0 /100 WBC
PLATELET # BLD AUTO: 245 THOU/MM3 (ref 130–400)
PMV BLD AUTO: 9.9 FL (ref 9.4–12.4)
POTASSIUM SERPL-SCNC: 3.4 MEQ/L (ref 3.5–5.2)
RBC # BLD AUTO: 3.61 MILL/MM3 (ref 4.7–6.1)
SODIUM SERPL-SCNC: 141 MEQ/L (ref 135–145)
WBC # BLD AUTO: 10.5 THOU/MM3 (ref 4.8–10.8)

## 2024-10-11 PROCEDURE — 2060000000 HC ICU INTERMEDIATE R&B

## 2024-10-11 PROCEDURE — 6370000000 HC RX 637 (ALT 250 FOR IP)

## 2024-10-11 PROCEDURE — 85025 COMPLETE CBC W/AUTO DIFF WBC: CPT

## 2024-10-11 PROCEDURE — 99239 HOSP IP/OBS DSCHRG MGMT >30: CPT | Performed by: NURSE PRACTITIONER

## 2024-10-11 PROCEDURE — 6370000000 HC RX 637 (ALT 250 FOR IP): Performed by: NURSE PRACTITIONER

## 2024-10-11 PROCEDURE — 80048 BASIC METABOLIC PNL TOTAL CA: CPT

## 2024-10-11 PROCEDURE — 36415 COLL VENOUS BLD VENIPUNCTURE: CPT

## 2024-10-11 RX ORDER — SODIUM CHLORIDE 0.9 % (FLUSH) 0.9 %
5-40 SYRINGE (ML) INJECTION PRN
Status: CANCELLED | OUTPATIENT
Start: 2024-10-11

## 2024-10-11 RX ORDER — SODIUM CHLORIDE 0.9 % (FLUSH) 0.9 %
5-40 SYRINGE (ML) INJECTION EVERY 12 HOURS SCHEDULED
Status: CANCELLED | OUTPATIENT
Start: 2024-10-11

## 2024-10-11 RX ORDER — SENNOSIDES A AND B 8.6 MG/1
1 TABLET, FILM COATED ORAL NIGHTLY
Status: CANCELLED | OUTPATIENT
Start: 2024-10-11

## 2024-10-11 RX ORDER — PANTOPRAZOLE SODIUM 40 MG/1
40 TABLET, DELAYED RELEASE ORAL
Status: CANCELLED | OUTPATIENT
Start: 2024-10-11

## 2024-10-11 RX ORDER — DOCUSATE SODIUM 100 MG/1
100 CAPSULE, LIQUID FILLED ORAL 2 TIMES DAILY
Status: CANCELLED | OUTPATIENT
Start: 2024-10-11

## 2024-10-11 RX ORDER — POLYETHYLENE GLYCOL 3350 17 G/17G
17 POWDER, FOR SOLUTION ORAL DAILY
Status: CANCELLED | OUTPATIENT
Start: 2024-10-12

## 2024-10-11 RX ADMIN — PANTOPRAZOLE SODIUM 40 MG: 40 TABLET, DELAYED RELEASE ORAL at 05:59

## 2024-10-11 RX ADMIN — PANTOPRAZOLE SODIUM 40 MG: 40 TABLET, DELAYED RELEASE ORAL at 18:02

## 2024-10-11 RX ADMIN — POTASSIUM BICARBONATE 20 MEQ: 782 TABLET, EFFERVESCENT ORAL at 10:00

## 2024-10-11 RX ADMIN — SENNOSIDES 8.6 MG: 8.6 TABLET, FILM COATED ORAL at 21:20

## 2024-10-11 RX ADMIN — POLYETHYLENE GLYCOL 3350 17 G: 17 POWDER, FOR SOLUTION ORAL at 10:00

## 2024-10-11 RX ADMIN — DOCUSATE SODIUM 100 MG: 100 CAPSULE, LIQUID FILLED ORAL at 10:00

## 2024-10-11 RX ADMIN — DOCUSATE SODIUM 100 MG: 100 CAPSULE, LIQUID FILLED ORAL at 21:20

## 2024-10-11 ASSESSMENT — PAIN SCALES - GENERAL
PAINLEVEL_OUTOF10: 0
PAINLEVEL_OUTOF10: 0

## 2024-10-11 NOTE — CARE COORDINATION
10/11/24, 9:23 AM EDT    Patient goals/plan/ treatment preferences discussed by  and .  Patient goals/plan/ treatment preferences reviewed with patient/ family.  Patient/ family verbalize understanding of discharge plan and are in agreement with goal/plan/treatment preferences.  Understanding was demonstrated using the teach back method.  AVS provided by RN at time of discharge, which includes all necessary medical information pertaining to the patients current course of illness, treatment, post-discharge goals of care, and treatment preferences.     Patient to discharge to      ,

## 2024-10-11 NOTE — PLAN OF CARE
Problem: Discharge Planning  Goal: Discharge to home or other facility with appropriate resources  Outcome: Progressing  Flowsheets (Taken 10/8/2024 2126 by José Miguel Qureshi RN)  Discharge to home or other facility with appropriate resources:   Arrange for needed discharge resources and transportation as appropriate   Identify barriers to discharge with patient and caregiver     Problem: Pain  Goal: Verbalizes/displays adequate comfort level or baseline comfort level  Outcome: Progressing  Flowsheets (Taken 10/8/2024 2126 by José Miguel Qureshi RN)  Verbalizes/displays adequate comfort level or baseline comfort level:   Encourage patient to monitor pain and request assistance   Assess pain using appropriate pain scale     Problem: Safety - Adult  Goal: Free from fall injury  Outcome: Progressing  Flowsheets (Taken 10/8/2024 2126 by José Miguel Qureshi RN)  Free From Fall Injury: Instruct family/caregiver on patient safety     Problem: Respiratory - Adult  Goal: Achieves optimal ventilation and oxygenation  Outcome: Progressing  Flowsheets (Taken 10/8/2024 2126 by José Miguel Qureshi RN)  Achieves optimal ventilation and oxygenation:   Assess for changes in respiratory status   Assess for changes in mentation and behavior     Problem: Skin/Tissue Integrity  Goal: Absence of new skin breakdown  Description: 1.  Monitor for areas of redness and/or skin breakdown  2.  Assess vascular access sites hourly  3.  Every 4-6 hours minimum:  Change oxygen saturation probe site  4.  Every 4-6 hours:  If on nasal continuous positive airway pressure, respiratory therapy assess nares and determine need for appliance change or resting period.  Outcome: Progressing     Problem: Neurosensory - Adult  Goal: Achieves stable or improved neurological status  Outcome: Progressing  Flowsheets (Taken 10/8/2024 2126 by José Miguel Qureshi RN)  Achieves stable or improved neurological status:   Assess for and report changes in neurological status   Initiate  renal function maintained:   Monitor labs and assess for signs and symptoms of volume excess or deficit   Monitor intake, output and patient weight     Problem: Nutrition Deficit:  Goal: Optimize nutritional status  Outcome: Progressing  Flowsheets (Taken 10/8/2024 2126 by José Miguel Qureshi, RN)  Nutrient intake appropriate for improving, restoring, or maintaining nutritional needs:   Assess nutritional status and recommend course of action   Monitor oral intake, labs, and treatment plans     Problem: Genitourinary - Adult  Goal: Absence of urinary retention  Outcome: Progressing  Flowsheets (Taken 10/8/2024 2126 by José Miguel Qureshi, RN)  Absence of urinary retention:   Assess patient’s ability to void and empty bladder   Monitor intake/output and perform bladder scan as needed     Problem: Self Harm/Suicidality  Goal: Will have no self-injury during hospital stay  Description: INTERVENTIONS:  1.  Ensure constant observer at bedside with Q15M safety checks  2.  Maintain a safe environment  3.  Secure patient belongings  4.  Ensure family/visitors adhere to safety recommendations  5.  Ensure safety tray has been added to patient's diet order  6.  Every shift and PRN: Re-assess suicidal risk via Frequent Screener    Outcome: Progressing  Flowsheets (Taken 10/8/2024 2126 by José Miguel Qureshi, RN)  Will have no self-injury during hospital stay:   Ensure constant observer at bedside with Q15M safety checks   Maintain a safe environment   Secure patient belongings   Ensure family/visitors adhere to safety recommendations   Ensure safety tray has been added to patient's diet order

## 2024-10-11 NOTE — PROGRESS NOTES
Pt is a 39y.o. male, sitting on the side of the bed having finished breakfast, in 4K-04. Jacqui is pleasant and conversation was held through video  as pt is [place] Cre[place]. Jacqui shared that he attempted to harm himself following distress over his wife expressing desire for divorce. He passionately shared importance of marriage to him and the commitment it is, becoming emotional at one point. He shared that he knows God is getting his attention through this, believes in Him but hasn't made a decision to follow Him as yet. He also lauded the care he has received and his interactions with those in [place] thus far, as being kind, caring and good people.  provided active listening, conversation around his marital issues, missing his children and desire to be successful here, words of encouragement and prayer-met with gratitude by Jacqui.     10/11/24 1015   Encounter Summary   Encounter Overview/Reason Spiritual/Emotional Needs   Service Provided For Patient   Referral/Consult From [facility]   Support System Family members   Last Encounter  10/11/24   Complexity of Encounter Moderate   Begin Time 1015   End Time  1102   Total Time Calculated 47 min   Spiritual/Emotional needs   Type Spiritual Support   Assessment/Intervention/Outcome   Assessment Calm;Coping;Impaired resilience;Peaceful   Intervention Active listening;Prayer (assurance of)/[place];Nurtured Hope;Explored/Affirmed feelings, thoughts, concerns;Discussed belief system/Islam practices/miguel angel   Outcome Engaged in conversation;Expressed feelings, needs, and concerns;Expressed Gratitude;Receptive;Coping
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
## 2024-10-11 NOTE — CARE COORDINATION
10/11/24, 10:47 AM EDT    Patient goals/plan/ treatment preferences discussed by  and .  Patient goals/plan/ treatment preferences reviewed with patient/ family.  Patient/ family verbalize understanding of discharge plan and are in agreement with goal/plan/treatment preferences.  Understanding was demonstrated using the teach back method.  AVS provided by RN at time of discharge, which includes all necessary medical information pertaining to the patients current course of illness, treatment, post-discharge goals of care, and treatment preferences.     Services At/After Discharge: Acute Hospital  Inpt Psych.    Discharge order in place. Admission to Inpt Psych planned.           TENDERNESS

## 2024-10-11 NOTE — PROGRESS NOTES
Reinforced suicide precautions with patient.  Reinforced that visitors will be screened and may be limited at the discretion of the nurse.  Visitor belongings are subject to be searched.  Belongings may not be allowed into the patient room.    Reviewed any personal belongings from the previous shift believed to be a threat to patient safety removed from room. Placed in secure area.    Room screened for safety, items removed to create a safe environment include:   Blood pressure cuff   Loose or extra cords, tubing (not of medical necessity)   Extra Linens   Telephone   Toiletries   Trash Liners   Patient's cell phone and charging cord (must be removed from room)      Safety tray ordered: yes    Expectations were discussed with sitter (unit support aide).  Sitter positioned near exit.  Sitter reminded that patient should be observed at all times including toileting and bathing.  Sitter has  documentation sheet.    Patient and sitter included in hourly rounds.

## 2024-10-11 NOTE — PROGRESS NOTES
Comprehensive Nutrition Assessment    Type and Reason for Visit: Reassess    Nutrition Recommendations/Plan:   Continue diet as ordered.   Initiate Ensure Plus TID (chocolate).   Recommend MVI.      Malnutrition Assessment:  Malnutrition Status: At risk for malnutrition (Comment)  Context: Acute Illness       Findings of the 6 clinical characteristics of malnutrition:  Energy Intake:  50% or less of estimated energy requirements for 5 or more days (intake ~50% of patient's typical for past 2 weeks PTA per significant other; currently intubated)  Weight Loss:  No significant weight loss     Body Fat Loss:  No significant body fat loss     Muscle Mass Loss:  No significant muscle mass loss    Fluid Accumulation:  No significant fluid accumulation     Strength:  Not Performed    Nutrition Assessment:    Pt improving from a nutritional standpoint AEB extubated on 10/8, diet advanced to a regular diet and tolerating well. At risk for further nutrition compromise r/t admit following ingestion of toxic substance (bleach powder), leukocytosis, poor appetite/ intake for 2 weeks PTA per S.O. Noted underlying medical condition (PMHx no PMHx).    Nutrition Related Findings:    Pt. Report/Treatments/Miscellaneous: Patient seen, spoke via . Per patient his appetite has been good. His stomach was hurting some prior to admission, but in the last few days his stomach feels fine and he has been eating well. He reports he has tried protein shakes before as he works out- he prefers chocolate. He reports he is feeling much better now, but he has a gassy feeling in his stomach.   GI Status: Last BM 10/6    Wound: None   Edema: none, per flowsheet   Pertinent Labs:   No results found for: \"GLUF\", \"LABA1C\"  Recent Labs     10/09/24  0901 10/11/24  0438    141   K 3.7 3.4*    106   GLUCOSE 96 102   BUN 13 9   CREATININE 0.8 0.7     Pertinent Medications: colace, protonix, miralax, senna     Current Nutrition

## 2024-10-12 ENCOUNTER — HOSPITAL ENCOUNTER (INPATIENT)
Age: 39
LOS: 3 days | Discharge: HOME | DRG: 882 | End: 2024-10-15
Attending: PSYCHIATRY & NEUROLOGY | Admitting: PSYCHIATRY & NEUROLOGY

## 2024-10-12 PROBLEM — F43.25 ADJUSTMENT DISORDER WITH MIXED DISTURBANCE OF EMOTIONS AND CONDUCT: Status: ACTIVE | Noted: 2024-10-12

## 2024-10-12 PROBLEM — F33.2 MDD (MAJOR DEPRESSIVE DISORDER), RECURRENT SEVERE, WITHOUT PSYCHOSIS (HCC): Status: ACTIVE | Noted: 2024-10-12

## 2024-10-12 LAB
BACTERIA BLD AEROBE CULT: NORMAL
BACTERIA BLD AEROBE CULT: NORMAL

## 2024-10-12 PROCEDURE — 6370000000 HC RX 637 (ALT 250 FOR IP): Performed by: NURSE PRACTITIONER

## 2024-10-12 PROCEDURE — 6370000000 HC RX 637 (ALT 250 FOR IP): Performed by: PSYCHIATRY & NEUROLOGY

## 2024-10-12 PROCEDURE — 1240000000 HC EMOTIONAL WELLNESS R&B

## 2024-10-12 RX ORDER — SENNOSIDES A AND B 8.6 MG/1
1 TABLET, FILM COATED ORAL NIGHTLY
Status: DISCONTINUED | OUTPATIENT
Start: 2024-10-12 | End: 2024-10-15 | Stop reason: HOSPADM

## 2024-10-12 RX ORDER — NICOTINE 21 MG/24HR
1 PATCH, TRANSDERMAL 24 HOURS TRANSDERMAL DAILY
Status: DISCONTINUED | OUTPATIENT
Start: 2024-10-12 | End: 2024-10-15 | Stop reason: HOSPADM

## 2024-10-12 RX ORDER — TRAZODONE HYDROCHLORIDE 50 MG/1
50 TABLET, FILM COATED ORAL NIGHTLY PRN
Status: DISCONTINUED | OUTPATIENT
Start: 2024-10-12 | End: 2024-10-15 | Stop reason: HOSPADM

## 2024-10-12 RX ORDER — SODIUM CHLORIDE 0.9 % (FLUSH) 0.9 %
5-40 SYRINGE (ML) INJECTION EVERY 12 HOURS SCHEDULED
Status: DISCONTINUED | OUTPATIENT
Start: 2024-10-12 | End: 2024-10-12

## 2024-10-12 RX ORDER — SODIUM CHLORIDE 0.9 % (FLUSH) 0.9 %
5-40 SYRINGE (ML) INJECTION PRN
Status: DISCONTINUED | OUTPATIENT
Start: 2024-10-12 | End: 2024-10-12

## 2024-10-12 RX ORDER — POLYETHYLENE GLYCOL 3350 17 G/17G
17 POWDER, FOR SOLUTION ORAL DAILY
Status: DISCONTINUED | OUTPATIENT
Start: 2024-10-12 | End: 2024-10-15 | Stop reason: HOSPADM

## 2024-10-12 RX ORDER — IBUPROFEN 400 MG/1
400 TABLET, FILM COATED ORAL EVERY 6 HOURS PRN
Status: DISCONTINUED | OUTPATIENT
Start: 2024-10-12 | End: 2024-10-15 | Stop reason: HOSPADM

## 2024-10-12 RX ORDER — PANTOPRAZOLE SODIUM 40 MG/1
40 TABLET, DELAYED RELEASE ORAL
Status: DISCONTINUED | OUTPATIENT
Start: 2024-10-12 | End: 2024-10-15 | Stop reason: HOSPADM

## 2024-10-12 RX ORDER — ACETAMINOPHEN 325 MG/1
650 TABLET ORAL EVERY 4 HOURS PRN
Status: DISCONTINUED | OUTPATIENT
Start: 2024-10-12 | End: 2024-10-15 | Stop reason: HOSPADM

## 2024-10-12 RX ORDER — MAGNESIUM HYDROXIDE/ALUMINUM HYDROXICE/SIMETHICONE 120; 1200; 1200 MG/30ML; MG/30ML; MG/30ML
30 SUSPENSION ORAL EVERY 6 HOURS PRN
Status: DISCONTINUED | OUTPATIENT
Start: 2024-10-12 | End: 2024-10-15 | Stop reason: HOSPADM

## 2024-10-12 RX ORDER — HYDROXYZINE HYDROCHLORIDE 25 MG/1
50 TABLET, FILM COATED ORAL 3 TIMES DAILY PRN
Status: DISCONTINUED | OUTPATIENT
Start: 2024-10-12 | End: 2024-10-15 | Stop reason: HOSPADM

## 2024-10-12 RX ORDER — CITALOPRAM HYDROBROMIDE 20 MG/1
20 TABLET ORAL DAILY
Status: DISCONTINUED | OUTPATIENT
Start: 2024-10-12 | End: 2024-10-15 | Stop reason: HOSPADM

## 2024-10-12 RX ORDER — DOCUSATE SODIUM 100 MG/1
100 CAPSULE, LIQUID FILLED ORAL 2 TIMES DAILY
Status: DISCONTINUED | OUTPATIENT
Start: 2024-10-12 | End: 2024-10-15 | Stop reason: HOSPADM

## 2024-10-12 RX ADMIN — PANTOPRAZOLE SODIUM 40 MG: 40 TABLET, DELAYED RELEASE ORAL at 16:32

## 2024-10-12 RX ADMIN — CITALOPRAM HYDROBROMIDE 20 MG: 20 TABLET ORAL at 13:11

## 2024-10-12 RX ADMIN — DOCUSATE SODIUM 100 MG: 100 CAPSULE, LIQUID FILLED ORAL at 21:11

## 2024-10-12 RX ADMIN — TRAZODONE HYDROCHLORIDE 50 MG: 50 TABLET ORAL at 21:11

## 2024-10-12 RX ADMIN — SENNOSIDES 8.6 MG: 8.6 TABLET, FILM COATED ORAL at 21:11

## 2024-10-12 ASSESSMENT — PATIENT HEALTH QUESTIONNAIRE - PHQ9
SUM OF ALL RESPONSES TO PHQ QUESTIONS 1-9: 0
1. LITTLE INTEREST OR PLEASURE IN DOING THINGS: NOT AT ALL
2. FEELING DOWN, DEPRESSED OR HOPELESS: NOT AT ALL
SUM OF ALL RESPONSES TO PHQ9 QUESTIONS 1 & 2: 0
SUM OF ALL RESPONSES TO PHQ QUESTIONS 1-9: 0

## 2024-10-12 ASSESSMENT — SLEEP AND FATIGUE QUESTIONNAIRES
AVERAGE NUMBER OF SLEEP HOURS: 3
DO YOU HAVE DIFFICULTY SLEEPING: YES
DO YOU USE A SLEEP AID: NO

## 2024-10-12 ASSESSMENT — LIFESTYLE VARIABLES
HOW OFTEN DO YOU HAVE A DRINK CONTAINING ALCOHOL: MONTHLY OR LESS
HOW MANY STANDARD DRINKS CONTAINING ALCOHOL DO YOU HAVE ON A TYPICAL DAY: 1 OR 2
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
## 2024-10-12 NOTE — PROGRESS NOTES
Daily Progress Note  Haroldo Dixon MD  10/12/2024    Reviewed patient's current plan of care and vital signs with nursing staff.  Sleep:  2.5 hours last night  Attending groups: No: Too late  No reported Suicidal thought. No interaction with peers & staff due to language barrier.  He has not been eating much.  He says that his stomach does not feel right.  He denies feeling depressed.  He is upset being on this unit    SUBJECTIVE:    Patient is feeling better. SUICIDAL IDEATION denies suicidal ideation.  Patient does not have medication side effects.    ROS: Patient has new complaints:  No  Sleeping adequately:  Yes  Visitors: No    Mental Status Examination:  Patient is cooperative. Speech: Normal rate and tone.  No abnormal movements, tics or mannerisms.  Mood dysthymic; affect normal affect. Suicidal ideation Absent.  Homicidal ideations Absent.   Hallucinations Absent.  Delusions Absent. Thought Content: normal. Thought Processes: Goal-Directed. Alert and oriented X 3.  Attention and concentration fair. MEMORY intact.  Insight and Judgement fair.      Data   height is 1.676 m (5' 6\") and weight is 62.6 kg (138 lb). His tympanic temperature is 97.9 °F (36.6 °C). His blood pressure is 108/78 and his pulse is 73. His respiration is 16 and oxygen saturation is 100%.   Labs:   No results displayed because visit has over 200 results.               Medications  Current Facility-Administered Medications: acetaminophen (TYLENOL) tablet 650 mg, 650 mg, Oral, Q4H PRN  ibuprofen (ADVIL;MOTRIN) tablet 400 mg, 400 mg, Oral, Q6H PRN  magnesium hydroxide (MILK OF MAGNESIA) 400 MG/5ML suspension 30 mL, 30 mL, Oral, Daily PRN  aluminum & magnesium hydroxide-simethicone (MAALOX) 200-200-20 MG/5ML suspension 30 mL, 30 mL, Oral, Q6H PRN  nicotine (NICODERM CQ) 21 MG/24HR 1 patch, 1 patch, TransDERmal, Daily  pantoprazole (PROTONIX) tablet 40 mg, 40 mg, Oral, BID AC  polyethylene glycol (GLYCOLAX) packet 17 g, 17 g, Oral,
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
## 2024-10-13 PROCEDURE — 6370000000 HC RX 637 (ALT 250 FOR IP): Performed by: NURSE PRACTITIONER

## 2024-10-13 PROCEDURE — 6370000000 HC RX 637 (ALT 250 FOR IP): Performed by: PSYCHIATRY & NEUROLOGY

## 2024-10-13 PROCEDURE — 1240000000 HC EMOTIONAL WELLNESS R&B

## 2024-10-13 RX ADMIN — DOCUSATE SODIUM 100 MG: 100 CAPSULE, LIQUID FILLED ORAL at 21:38

## 2024-10-13 RX ADMIN — PANTOPRAZOLE SODIUM 40 MG: 40 TABLET, DELAYED RELEASE ORAL at 16:42

## 2024-10-13 RX ADMIN — TRAZODONE HYDROCHLORIDE 50 MG: 50 TABLET ORAL at 21:38

## 2024-10-13 RX ADMIN — ACETAMINOPHEN 650 MG: 325 TABLET ORAL at 07:50

## 2024-10-13 RX ADMIN — SENNOSIDES 8.6 MG: 8.6 TABLET, FILM COATED ORAL at 21:38

## 2024-10-13 RX ADMIN — PANTOPRAZOLE SODIUM 40 MG: 40 TABLET, DELAYED RELEASE ORAL at 07:50

## 2024-10-13 RX ADMIN — CITALOPRAM HYDROBROMIDE 20 MG: 20 TABLET ORAL at 07:50

## 2024-10-13 RX ADMIN — ACETAMINOPHEN 650 MG: 325 TABLET ORAL at 21:37

## 2024-10-13 ASSESSMENT — PAIN SCALES - GENERAL
PAINLEVEL_OUTOF10: 7
PAINLEVEL_OUTOF10: 0
PAINLEVEL_OUTOF10: 2

## 2024-10-13 ASSESSMENT — PAIN DESCRIPTION - ORIENTATION
ORIENTATION: MID
ORIENTATION: INNER

## 2024-10-13 ASSESSMENT — PAIN DESCRIPTION - DESCRIPTORS
DESCRIPTORS: ACHING
DESCRIPTORS: ACHING

## 2024-10-13 ASSESSMENT — PAIN SCALES - WONG BAKER
WONGBAKER_NUMERICALRESPONSE: NO HURT

## 2024-10-13 ASSESSMENT — PAIN DESCRIPTION - LOCATION
LOCATION: HEAD
LOCATION: HEAD

## 2024-10-13 ASSESSMENT — PAIN - FUNCTIONAL ASSESSMENT
PAIN_FUNCTIONAL_ASSESSMENT: ACTIVITIES ARE NOT PREVENTED
PAIN_FUNCTIONAL_ASSESSMENT: ACTIVITIES ARE NOT PREVENTED

## 2024-10-13 NOTE — PROGRESS NOTES
Daily Progress Note  Haroldo Dixon MD  10/13/2024    Reviewed patient's current plan of care and vital signs with nursing staff.  Sleep:  6.5 hours last night  Attending groups: No due to language barrier  No reported Suicidal thought. No interaction with peers & staff due to language barrier.  He was out of his room for dinner and to visit with his wife.     SUBJECTIVE:    Patient is feeling better. SUICIDAL IDEATION denies suicidal ideation.  Patient does not have medication side effects.    ROS: Patient has new complaints:  No  Sleeping adequately:  Yes  Visitors: Yes    Mental Status Examination:  Patient is cooperative. Speech: Normal rate and tone.  No abnormal movements, tics or mannerisms.  Mood dysthymic; affect normal affect. Suicidal ideation Absent.  Homicidal ideations Absent.   Hallucinations Absent.  Delusions Absent. Thought Content: normal. Thought Processes: Goal-Directed. Alert and oriented X 3.  Attention and concentration fair. MEMORY intact.  Insight and Judgement fair.      Data   height is 1.676 m (5' 6\") and weight is 62.6 kg (138 lb). His oral temperature is 98.1 °F (36.7 °C). His blood pressure is 105/64 and his pulse is 71. His respiration is 16 and oxygen saturation is 100%.   Labs:            Medications  Current Facility-Administered Medications: acetaminophen (TYLENOL) tablet 650 mg, 650 mg, Oral, Q4H PRN  ibuprofen (ADVIL;MOTRIN) tablet 400 mg, 400 mg, Oral, Q6H PRN  magnesium hydroxide (MILK OF MAGNESIA) 400 MG/5ML suspension 30 mL, 30 mL, Oral, Daily PRN  aluminum & magnesium hydroxide-simethicone (MAALOX) 200-200-20 MG/5ML suspension 30 mL, 30 mL, Oral, Q6H PRN  nicotine (NICODERM CQ) 21 MG/24HR 1 patch, 1 patch, TransDERmal, Daily  pantoprazole (PROTONIX) tablet 40 mg, 40 mg, Oral, BID AC  polyethylene glycol (GLYCOLAX) packet 17 g, 17 g, Oral, Daily  docusate sodium (COLACE) capsule 100 mg, 100 mg, Oral, BID  senna (SENOKOT) tablet 8.6 mg, 1 tablet, Oral, Nightly  citalopram

## 2024-10-13 NOTE — BH NOTE
Pt declined to attend 0900 Community Meeting and Goal group therapy session offered today. Pt was provided maximum verbal encouragement to attend group therapy session. Pt remained resting in bed. No daily goal was obtained.         
knowledge of coping skills through participation in group therapy sessions following verbal encouragement from staff.

## 2024-10-14 PROCEDURE — 6370000000 HC RX 637 (ALT 250 FOR IP): Performed by: PSYCHIATRY & NEUROLOGY

## 2024-10-14 PROCEDURE — 6370000000 HC RX 637 (ALT 250 FOR IP): Performed by: NURSE PRACTITIONER

## 2024-10-14 PROCEDURE — 1240000000 HC EMOTIONAL WELLNESS R&B

## 2024-10-14 RX ADMIN — SENNOSIDES 8.6 MG: 8.6 TABLET, FILM COATED ORAL at 20:53

## 2024-10-14 RX ADMIN — DOCUSATE SODIUM 100 MG: 100 CAPSULE, LIQUID FILLED ORAL at 09:19

## 2024-10-14 RX ADMIN — CITALOPRAM HYDROBROMIDE 20 MG: 20 TABLET ORAL at 09:18

## 2024-10-14 RX ADMIN — PANTOPRAZOLE SODIUM 40 MG: 40 TABLET, DELAYED RELEASE ORAL at 09:20

## 2024-10-14 RX ADMIN — DOCUSATE SODIUM 100 MG: 100 CAPSULE, LIQUID FILLED ORAL at 20:53

## 2024-10-14 RX ADMIN — POLYETHYLENE GLYCOL 3350 17 G: 17 POWDER, FOR SOLUTION ORAL at 09:21

## 2024-10-14 RX ADMIN — PANTOPRAZOLE SODIUM 40 MG: 40 TABLET, DELAYED RELEASE ORAL at 16:50

## 2024-10-14 RX ADMIN — TRAZODONE HYDROCHLORIDE 50 MG: 50 TABLET ORAL at 20:53

## 2024-10-14 ASSESSMENT — PAIN SCALES - GENERAL
PAINLEVEL_OUTOF10: 0
PAINLEVEL_OUTOF10: 0

## 2024-10-14 NOTE — PROGRESS NOTES
Patient has been improving since he got admitted , he was assessed today with aid of  services , name Radha with number [id number]. He expressed his gratitude to member of staff and peers for making him feel welcome as his perception about the unit has changed . Overall he's doing good and plans to keep making progress . Patient was present at [facility] but didn't communicate owing to language barrier .

## 2024-10-14 NOTE — PROGRESS NOTES
Daily Progress Note  Haroldo Dixon MD  10/14/2024    Reviewed patient's current plan of care and vital signs with nursing staff.  Sleep: 7 hours last night  Attending groups: No due to language barrier  No reported Suicidal thought. No interaction with peers & staff due to language barrier.  He was out of his room more. Mood 8 on a scale of 1 to 10 with 10 is feeling normal.  He is hopeful for the future.    SUBJECTIVE:    Patient is feeling better. SUICIDAL IDEATION denies suicidal ideation.  Patient does not have medication side effects.    ROS: Patient has new complaints:  No  Sleeping adequately:  Yes  Visitors: Yes    Mental Status Examination:  Patient is cooperative. Speech: Normal rate and tone.  No abnormal movements, tics or mannerisms.  Mood dysthymic; affect normal affect. Suicidal ideation Absent.  Homicidal ideations Absent.   Hallucinations Absent.  Delusions Absent. Thought Content: normal. Thought Processes: Goal-Directed. Alert and oriented X 3.  Attention and concentration fair. MEMORY intact.  Insight and Judgement fair.      Data   height is 1.676 m (5' 6\") and weight is 62.6 kg (138 lb). His oral temperature is 99 °F (37.2 °C). His blood pressure is 104/78 and his pulse is 70. His respiration is 14 and oxygen saturation is 100%.   Labs:            Medications  Current Facility-Administered Medications: acetaminophen (TYLENOL) tablet 650 mg, 650 mg, Oral, Q4H PRN  ibuprofen (ADVIL;MOTRIN) tablet 400 mg, 400 mg, Oral, Q6H PRN  magnesium hydroxide (MILK OF MAGNESIA) 400 MG/5ML suspension 30 mL, 30 mL, Oral, Daily PRN  aluminum & magnesium hydroxide-simethicone (MAALOX) 200-200-20 MG/5ML suspension 30 mL, 30 mL, Oral, Q6H PRN  nicotine (NICODERM CQ) 21 MG/24HR 1 patch, 1 patch, TransDERmal, Daily  pantoprazole (PROTONIX) tablet 40 mg, 40 mg, Oral, BID AC  polyethylene glycol (GLYCOLAX) packet 17 g, 17 g, Oral, Daily  docusate sodium (COLACE) capsule 100 mg, 100 mg, Oral, BID  senna (SENOKOT) tablet

## 2024-10-15 VITALS
RESPIRATION RATE: 16 BRPM | BODY MASS INDEX: 22.18 KG/M2 | SYSTOLIC BLOOD PRESSURE: 113 MMHG | HEIGHT: 66 IN | TEMPERATURE: 98.2 F | HEART RATE: 74 BPM | DIASTOLIC BLOOD PRESSURE: 71 MMHG | WEIGHT: 138 LBS | OXYGEN SATURATION: 100 %

## 2024-10-15 PROCEDURE — 5130000000 HC BRIDGE APPOINTMENT

## 2024-10-15 PROCEDURE — 6370000000 HC RX 637 (ALT 250 FOR IP): Performed by: NURSE PRACTITIONER

## 2024-10-15 PROCEDURE — 6370000000 HC RX 637 (ALT 250 FOR IP): Performed by: PSYCHIATRY & NEUROLOGY

## 2024-10-15 RX ORDER — TRAZODONE HYDROCHLORIDE 50 MG/1
50 TABLET, FILM COATED ORAL NIGHTLY PRN
Qty: 30 TABLET | Refills: 0 | Status: SHIPPED | OUTPATIENT
Start: 2024-10-15

## 2024-10-15 RX ORDER — CITALOPRAM HYDROBROMIDE 20 MG/1
20 TABLET ORAL DAILY
Qty: 30 TABLET | Refills: 0 | Status: SHIPPED | OUTPATIENT
Start: 2024-10-15

## 2024-10-15 RX ORDER — PANTOPRAZOLE SODIUM 40 MG/1
40 TABLET, DELAYED RELEASE ORAL
Qty: 60 TABLET | Refills: 0 | Status: SHIPPED | OUTPATIENT
Start: 2024-10-15

## 2024-10-15 RX ADMIN — CITALOPRAM HYDROBROMIDE 20 MG: 20 TABLET ORAL at 10:31

## 2024-10-15 RX ADMIN — POLYETHYLENE GLYCOL 3350 17 G: 17 POWDER, FOR SOLUTION ORAL at 10:32

## 2024-10-15 RX ADMIN — DOCUSATE SODIUM 100 MG: 100 CAPSULE, LIQUID FILLED ORAL at 10:32

## 2024-10-15 ASSESSMENT — PAIN SCALES - WONG BAKER: WONGBAKER_NUMERICALRESPONSE: NO HURT

## 2024-10-15 ASSESSMENT — PAIN DESCRIPTION - DESCRIPTORS: DESCRIPTORS: ACHING

## 2024-10-15 ASSESSMENT — PAIN SCALES - GENERAL: PAINLEVEL_OUTOF10: 0

## 2024-10-15 ASSESSMENT — PAIN - FUNCTIONAL ASSESSMENT: PAIN_FUNCTIONAL_ASSESSMENT: ACTIVITIES ARE NOT PREVENTED

## 2024-10-15 NOTE — PROGRESS NOTES
Discharge planning- Jacqui is to go to [facility] for an appointment on Wednesday, October 23 at 8:15am.

## 2024-10-15 NOTE — PLAN OF CARE
Problem: Coping  Goal: Pt/Family able to verbalize concerns and demonstrate effective coping strategies  Description: INTERVENTIONS:  1. Assist patient/family to identify coping skills, available support systems and cultural and spiritual values  2. Provide emotional support, including active listening and acknowledgement of concerns of patient and caregivers  3. Reduce environmental stimuli, as able  4. Instruct patient/family in relaxation techniques, as appropriate  5. Assess for spiritual pain/suffering and initiate Spiritual Care, Psychosocial Clinical Specialist consults as needed  10/13/2024 1459 by Mack Kohler OT  Outcome: Not Progressing  Flowsheets (Taken 10/13/2024 1459)  Patient/family able to verbalize anxieties, fears, and concerns, and demonstrate effective coping:   Provide emotional support, including active listening and acknowledgement of concerns of patient and caregivers   Assist patient/family to identify coping skills, available support systems and cultural and spiritual values  Note: Pt has attended 0/3 group therapy sessions offered thus far today. Pt has been given maximum verbal encouragement to attend group therapy sessions, pt has been isolative to his room the majority of the day. Intermittently, pt is seen out on the unit interacting with others. Plan to continue to monitor progress and encourage participation in group therapy programming.    10/13/2024 0925 by Humes, Heather, RN  Outcome: Progressing  Flowsheets (Taken 10/12/2024 2340 by Cristian Still RN)  Patient/family able to verbalize anxieties, fears, and concerns, and demonstrate effective coping: Assist patient/family to identify coping skills, available support systems and cultural and spiritual values  Note: Patient encouraged to identify positive coping skills.      
  Problem: Coping  Goal: Pt/Family able to verbalize concerns and demonstrate effective coping strategies  Description: INTERVENTIONS:  1. Assist patient/family to identify coping skills, available support systems and cultural and spiritual values  2. Provide emotional support, including active listening and acknowledgement of concerns of patient and caregivers  3. Reduce environmental stimuli, as able  4. Instruct patient/family in relaxation techniques, as appropriate  5. Assess for spiritual pain/suffering and initiate Spiritual Care, Psychosocial Clinical Specialist consults as needed  10/14/2024 1636 by Mack Kohler OT  Outcome: Not Progressing  Flowsheets (Taken 10/14/2024 1636)  Patient/family able to verbalize anxieties, fears, and concerns, and demonstrate effective coping:   Assist patient/family to identify coping skills, available support systems and cultural and spiritual values   Provide emotional support, including active listening and acknowledgement of concerns of patient and caregivers  Note: Pt has attended 0/4 group therapy sessions offered thus far today. Pt has been given maximum verbal encouragement to attend group therapy sessions, pt has been isolative to his room the majority of the day. Intermittently, pt is seen out on the unit interacting with others. Plan to continue to monitor progress and encourage participation in group therapy programming.    10/14/2024 1045 by Umu Burns, RN  Outcome: Progressing  Flowsheets (Taken 10/14/2024 0112 by Cristian Still RN)  Patient/family able to verbalize anxieties, fears, and concerns, and demonstrate effective coping: Provide emotional support, including active listening and acknowledgement of concerns of patient and caregivers     
  Problem: Safety - Adult  Goal: Free from fall injury  10/13/2024 0925 by Humes, Heather, RN  Outcome: Progressing  Flowsheets (Taken 10/12/2024 0933)  Free From Fall Injury: Instruct family/caregiver on patient safety  Note: Patient free from falls so far this shift.      Problem: Anxiety  Goal: Will report anxiety at manageable levels  Description: INTERVENTIONS:  1. Administer medication as ordered  2. Teach and rehearse alternative coping skills  3. Provide emotional support with 1:1 interaction with staff  10/13/2024 0925 by Humes, Heather, RN  Outcome: Progressing  Flowsheets (Taken 10/12/2024 2340 by Cristian Still RN)  Will report anxiety at manageable levels: Teach and rehearse alternative coping skills  Note: Patient denies anxiety so far this shift.      Problem: Coping  Goal: Pt/Family able to verbalize concerns and demonstrate effective coping strategies  Description: INTERVENTIONS:  1. Assist patient/family to identify coping skills, available support systems and cultural and spiritual values  2. Provide emotional support, including active listening and acknowledgement of concerns of patient and caregivers  3. Reduce environmental stimuli, as able  4. Instruct patient/family in relaxation techniques, as appropriate  5. Assess for spiritual pain/suffering and initiate Spiritual Care, Psychosocial Clinical Specialist consults as needed  10/13/2024 0925 by Humes, Heather, RN  Outcome: Progressing  Flowsheets (Taken 10/12/2024 2340 by Cristian Still RN)  Patient/family able to verbalize anxieties, fears, and concerns, and demonstrate effective coping: Assist patient/family to identify coping skills, available support systems and cultural and spiritual values  Note: Patient encouraged to identify positive coping skills.      Problem: Confusion  Goal: Confusion, delirium, dementia, or psychosis is improved or at baseline  Description: INTERVENTIONS:  1. Assess for possible contributors to 
  Problem: Safety - Adult  Goal: Free from fall injury  Note: Patient will be free of self harm and injury during hospital stay.     Problem: Anxiety  Goal: Will report anxiety at manageable levels  Description: INTERVENTIONS:  1. Administer medication as ordered  2. Teach and rehearse alternative coping skills  3. Provide emotional support with 1:1 interaction with staff  Flowsheets (Taken 10/12/2024 2340)  Will report anxiety at manageable levels: Teach and rehearse alternative coping skills  Note: Denies anxiety     Problem: Coping  Goal: Pt/Family able to verbalize concerns and demonstrate effective coping strategies  Description: INTERVENTIONS:  1. Assist patient/family to identify coping skills, available support systems and cultural and spiritual values  2. Provide emotional support, including active listening and acknowledgement of concerns of patient and caregivers  3. Reduce environmental stimuli, as able  4. Instruct patient/family in relaxation techniques, as appropriate  5. Assess for spiritual pain/suffering and initiate Spiritual Care, Psychosocial Clinical Specialist consults as needed  Flowsheets (Taken 10/12/2024 2340)  Patient/family able to verbalize anxieties, fears, and concerns, and demonstrate effective coping: Assist patient/family to identify coping skills, available support systems and cultural and spiritual values  Note: Verbalized rest as a coping skill since admission      Problem: Confusion  Goal: Confusion, delirium, dementia, or psychosis is improved or at baseline  Description: INTERVENTIONS:  1. Assess for possible contributors to thought disturbance, including medications, impaired vision or hearing, underlying metabolic abnormalities, dehydration, psychiatric diagnoses, and notify attending LIP  2. [place] high risk fall precautions, as indicated  3. Provide frequent short contacts to provide reality reorientation, refocusing and direction  4. Decrease environmental stimuli, 
  Problem: Safety - Adult  Goal: Free from fall injury  Outcome: Progressing  Flowsheets (Taken 10/12/2024 0933)  Free From Fall Injury: Instruct family/caregiver on patient safety  Note: Patient free from falls so far this shift.      Problem: Anxiety  Goal: Will report anxiety at manageable levels  Description: INTERVENTIONS:  1. Administer medication as ordered  2. Teach and rehearse alternative coping skills  3. Provide emotional support with 1:1 interaction with staff  Outcome: Progressing  Flowsheets (Taken 10/12/2024 0933)  Will report anxiety at manageable levels: Teach and rehearse alternative coping skills  Note: Patient denies anxiety at this time.      Problem: Coping  Goal: Pt/Family able to verbalize concerns and demonstrate effective coping strategies  Description: INTERVENTIONS:  1. Assist patient/family to identify coping skills, available support systems and cultural and spiritual values  2. Provide emotional support, including active listening and acknowledgement of concerns of patient and caregivers  3. Reduce environmental stimuli, as able  4. Instruct patient/family in relaxation techniques, as appropriate  5. Assess for spiritual pain/suffering and initiate Spiritual Care, Psychosocial Clinical Specialist consults as needed  Outcome: Progressing  Flowsheets (Taken 10/12/2024 0933)  Patient/family able to verbalize anxieties, fears, and concerns, and demonstrate effective coping: Assist patient/family to identify coping skills, available support systems and cultural and spiritual values  Note: Patient encouraged to attend groups in order to develop positive coping skills.      Problem: Confusion  Goal: Confusion, delirium, dementia, or psychosis is improved or at baseline  Description: INTERVENTIONS:  1. Assess for possible contributors to thought disturbance, including medications, impaired vision or hearing, underlying metabolic abnormalities, dehydration, psychiatric diagnoses, and notify 
  Problem: Safety - Adult  Goal: Free from fall injury  Outcome: Progressing  Note: Patient will be free of self harm and injury during hospital stay.     Problem: Anxiety  Goal: Will report anxiety at manageable levels  Description: INTERVENTIONS:  1. Administer medication as ordered  2. Teach and rehearse alternative coping skills  3. Provide emotional support with 1:1 interaction with staff  Outcome: Progressing  Flowsheets (Taken 10/14/2024 0112)  Will report anxiety at manageable levels: Teach and rehearse alternative coping skills  Note: Patient denies anxiety so far this shift.     Problem: Coping  Goal: Pt/Family able to verbalize concerns and demonstrate effective coping strategies  Description: INTERVENTIONS:  1. Assist patient/family to identify coping skills, available support systems and cultural and spiritual values  2. Provide emotional support, including active listening and acknowledgement of concerns of patient and caregivers  3. Reduce environmental stimuli, as able  4. Instruct patient/family in relaxation techniques, as appropriate  5. Assess for spiritual pain/suffering and initiate Spiritual Care, Psychosocial Clinical Specialist consults as needed  10/14/2024 0118 by Cristian Still RN  Outcome: Progressing  Flowsheets (Taken 10/14/2024 0112)  Patient/family able to verbalize anxieties, fears, and concerns, and demonstrate effective coping: Provide emotional support, including active listening and acknowledgement of concerns of patient and caregivers  Note: Verbalizes resting and watching tv as a good coping skill so far   10/13/2024 1459 by Mack Kohler OT  Outcome: Not Progressing  Flowsheets (Taken 10/13/2024 1459)  Patient/family able to verbalize anxieties, fears, and concerns, and demonstrate effective coping:   Provide emotional support, including active listening and acknowledgement of concerns of patient and caregivers   Assist patient/family to identify coping skills, 
Assess patient/family's coping skills and  non-compliant behavior (including use of illegal substances)  2. Notify security of behavior or suspected illegal substances which indicate the need for search of the family and/or belongings  3. Encourage verbalization of thoughts and concerns in a socially appropriate manner  4. Utilize positive, consistent limit setting strategies supporting safety of patient, staff and others  5. Encourage participation in the decision making process about the behavioral management agreement  6. If a visitor's behavior poses a threat to safety call refer to organization policy.  7. Initiate consult with , Psychosocial CNS, Spiritual Care as appropriate  10/14/2024 2212 by Albina Prabhakar LPN  Outcome: Adequate for Discharge  Flowsheets (Taken 10/14/2024 1045 by Umu Burns, RN)  Patient/family maintains appropriate behavior and adheres to behavioral management agreement, if implemented:   Assess patient/family’s coping skills and  non-compliant behavior (including use of illegal substances)   Encourage verbalization of thoughts and concerns in a socially appropriate manner   Encourage participation in the decision making process about the behavioral management agreement  Note: Patient is pleasant and cooperative this shift. No behavioral issues noted.   10/14/2024 1045 by Umu Burns, RN  Outcome: Adequate for Discharge  Flowsheets (Taken 10/14/2024 1045)  Patient/family maintains appropriate behavior and adheres to behavioral management agreement, if implemented:   Assess patient/family’s coping skills and  non-compliant behavior (including use of illegal substances)   Encourage verbalization of thoughts and concerns in a socially appropriate manner   Encourage participation in the decision making process about the behavioral management agreement  Note: Patient is pleasant, cooperative and appropriate at this time.     Problem: Depression/Self Harm  Goal: Effect of 
teaching  Taken 10/14/2024 0112  Effect of psychiatric condition will be minimized and patient will be protected from self harm: Assess impact of patient’s symptoms on level of functioning, self care needs and offer support as indicated  Note: Denies being depressed      Problem: Discharge Planning  Goal: Discharge to home or other facility with appropriate resources  10/14/2024 1045 by Umu Burns, RN  Outcome: Progressing  Flowsheets (Taken 10/14/2024 1045)  Discharge to home or other facility with appropriate resources:   Identify barriers to discharge with patient and caregiver   Arrange for needed discharge resources and transportation as appropriate  Note: Discharge planning is in progress.   10/14/2024 0118 by Cristian Still, ANIYA  Outcome: Progressing  Note: Discharge planning in progress .      Problem: Pain  Goal: Verbalizes/displays adequate comfort level or baseline comfort level  10/14/2024 1045 by Umu Burns, RN  Outcome: Progressing  Flowsheets (Taken 10/14/2024 0830)  Verbalizes/displays adequate comfort level or baseline comfort level:   Administer analgesics based on type and severity of pain and evaluate response   Assess pain using appropriate pain scale   Implement non-pharmacological measures as appropriate and evaluate response  Note: Pain denied currently.  10/14/2024 0118 by Cristian Still, ANIYA  Outcome: Progressing  Note: Had a headache of 7/10 , is resting now in room with eyes closed .   Care plan reviewed with patient.  Patient does verbalize understanding of the plan of care and does contribute to goal setting

## 2024-10-15 NOTE — DISCHARGE SUMMARY
Physician Discharge Summary     Patient ID:  Isnlod Pia  [id number]  39 y.o.  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    Discharge Diagnoses:   Adjustment disorder with mixed disturbance of emotions and conduct     No past medical history on file.     Admission Condition: poor    Discharged Condition: stable    Indication for Admission: threat to self    Significant Diagnostic Studies:   See Results Review tab in EHR      TREATMENT AND CLINICAL COURSE:   Patient was admitted on the unit. Routine lab was ordered. Physical examination was within normal limits. At admission, patient was started on ***; Hydroxyzine & Trazodone were added. Patient did not have side effect from medications. Patient was involved in group and milieu therapy. Although patient was suicidal upon admission, patient did not have suicidal thought during this hospital stay. I strongly encouraged patient's sobriety from illicit drugs and alcohol.  I spent some time discussing the effect of illicit drugs & alcohol on patient's mood. We discussed about smoking cessation. Toward the end of the hospital stay, patient become more hopeful. Overall, hospital stay was uncomplicated, and patient was discharged in stable condition.    Consults: none    Treatments: Psychotropic medications, therapy with group, milieu, and 1:1 with nurses, social workers and Attending physician.      Discharge Medications:  Current Discharge Medication List        START taking these medications    Details   citalopram (CELEXA) 20 MG tablet Take 1 tablet by mouth daily  Qty: 30 tablet,

## 2024-10-15 NOTE — PROGRESS NOTES
During  assessment, patient makes inquiry about possible discharge timing for tomorrow. States wishes to be discharged by 0800 due to wife work-hours beginning at 0900. Writer explained that timing can vary, but that this information will be passed along and that every effort will be made to accommodate.

## 2024-10-15 NOTE — DISCHARGE INSTRUCTIONS
Keep all follow-up appointments and take medications as directed.     Call the hope line if needed at :  [place] [phone].  [phone].  [phone].  [place] [phone].  [phone].  [facility] [phone]    Symptoms to report to your Doctor:  Depression  Inability to eat, sleep, or have a bowel movement  Increased sleepiness and lethargy  Voices in your head  Any thoughts of harming self or others    Things to avoid:  Caffeine  Alcohol  No street drugs  Over the counter medications unless Ok'd by your physician or pharmacist.  Driving or operating machinery until full effects of your medications are known.  Driving or operating machinery if dizzy or drowsy from medications.  Use journal as directed.    Education:  Illness and medication teaching was completed.    Discharge Disposition: Patient was discharged to *** and was transported by***. Patient was accompanied by***.      Information sent to next level of care:    ____Admission orders to Long Term Care    ___x_Discharge instructions    ____Behavioral Services Assessment    ____Hand off Summary    ____History and Physical    ____Last dose MAR    ____Patient transfer form    ____Other     [facility] Hotline:  [phone]    Crisis phone numbers:  [place] [phone].  [place] [phone]  [phone].  [place] [phone].  [phone].  [facility] [phone].    [facility]  [address], [place] [id number] [phone]    [facility]  [address] [place], [place] [address] [place], [place]

## 2024-10-15 NOTE — PROGRESS NOTES
Daily Progress Note  Haroldo Dixon MD  10/15/2024    Reviewed patient's current plan of care and vital signs with nursing staff.  Sleep: 5.5 hours last night  Attending groups: No due to language barrier  No reported Suicidal thought. No interaction with peers & staff due to language barrier.  He is out of his room more. Mood 10 on a scale of 1 to 10 with 10 is feeling normal.  He is hopeful and feels ready for discharge.    SUBJECTIVE:    Patient is feeling better. SUICIDAL IDEATION denies suicidal ideation.  Patient does not have medication side effects.    ROS: Patient has new complaints:  No  Sleeping adequately:  Yes  Visitors: Yes    Mental Status Examination:  Patient is cooperative. Speech: Normal rate and tone.  No abnormal movements, tics or mannerisms.  Mood euthymic; affect normal affect. Suicidal ideation Absent.  Homicidal ideations Absent.   Hallucinations Absent.  Delusions Absent. Thought Content: normal. Thought Processes: Goal-Directed. Alert and oriented X 3.  Attention and concentration fair. MEMORY intact.  Insight and Judgement fair.      Data   height is 1.676 m (5' 6\") and weight is 62.6 kg (138 lb). His oral temperature is 98.4 °F (36.9 °C). His blood pressure is 120/76 and his pulse is 80. His respiration is 20 and oxygen saturation is 100%.   Labs:            Medications  Current Facility-Administered Medications: acetaminophen (TYLENOL) tablet 650 mg, 650 mg, Oral, Q4H PRN  ibuprofen (ADVIL;MOTRIN) tablet 400 mg, 400 mg, Oral, Q6H PRN  magnesium hydroxide (MILK OF MAGNESIA) 400 MG/5ML suspension 30 mL, 30 mL, Oral, Daily PRN  aluminum & magnesium hydroxide-simethicone (MAALOX) 200-200-20 MG/5ML suspension 30 mL, 30 mL, Oral, Q6H PRN  nicotine (NICODERM CQ) 21 MG/24HR 1 patch, 1 patch, TransDERmal, Daily  pantoprazole (PROTONIX) tablet 40 mg, 40 mg, Oral, BID AC  polyethylene glycol (GLYCOLAX) packet 17 g, 17 g, Oral, Daily  docusate sodium (COLACE) capsule 100 mg, 100 mg, Oral, BID  senna

## 2024-10-15 NOTE — PROGRESS NOTES
Behavioral Health   Discharge Note    Pt discharged with followings belongings:   Dental Appliances: None  Vision - Corrective Lenses: None  Hearing Aid: None  Jewelry: None  Body Piercings Removed: No  Clothing: Other (Comment) (None)  Other Valuables: Other (Comment) (None)   Valuables retrieved from safe, security envelope number:  NA and returned to patient.  Patient left department with wife Lisbet and writer via ambulation.  Discharged to NA. \"An Important Message from Medicare About Your Rights\" (IMM) form photocopy original from admission and provided to pt at least 4 hours prior to discharge N/A. \"An Important Message from [employer] About Your Rights\" (IMM) form photocopy original from admission. N/A. If pt left within 4 hours of receiving 2nd delivery of IMM, this is because pt was agreeable with hospital discharge.  Patient/guardian education on aftercare instructions: Yes  Bridge appointment completed:  yes.  Reviewed Discharge Instructions with patient/family/nursing facility.  Patient/family verbalizes understanding and agreement with the discharge plan using the teachback method.   Information faxed to NA by JREO Patient/family verbalize understanding of AVS:Yes    Status EXAM upon discharge:  Mental Status and Behavioral Exam  Normal: Yes  Level of Assistance: Independent/Self  Facial Expression: Brightened  Affect: Appropriate  Level of Consciousness: Alert  Frequency of Checks: 4 times per hour, close  Mood:Normal: Yes  Motor Activity:Normal: Yes  Eye Contact: Good  Observed Behavior: Cooperative, Friendly  Sexual Misconduct History: Current - no  Preception: [place] to person, [place] to time, [place] to place, [place] to situation  Attention:Normal: Yes  Attention: Distractible  Thought Processes: Unremarkable  Thought Content:Normal: Yes  Depression Symptoms: No problems reported or observed.  Anxiety Symptoms: No problems reported or observed.  Alena Symptoms: No problems reported or

## 2024-10-15 NOTE — PROGRESS NOTES
This RN has reviewed and agrees with Albina Prabhakar LPN's data collection and has collaborated with this LPN regarding the patient's care plan.